# Patient Record
Sex: FEMALE | Race: WHITE | NOT HISPANIC OR LATINO | URBAN - METROPOLITAN AREA
[De-identification: names, ages, dates, MRNs, and addresses within clinical notes are randomized per-mention and may not be internally consistent; named-entity substitution may affect disease eponyms.]

---

## 2018-03-12 ENCOUNTER — INPATIENT (INPATIENT)
Facility: HOSPITAL | Age: 83
LOS: 7 days | Discharge: HOME | End: 2018-03-20
Attending: INTERNAL MEDICINE

## 2018-03-12 VITALS
RESPIRATION RATE: 18 BRPM | HEART RATE: 72 BPM | DIASTOLIC BLOOD PRESSURE: 82 MMHG | OXYGEN SATURATION: 100 % | TEMPERATURE: 97 F | SYSTOLIC BLOOD PRESSURE: 109 MMHG

## 2018-03-12 LAB
ALBUMIN SERPL ELPH-MCNC: 2.6 G/DL — LOW (ref 3–5.5)
ALP SERPL-CCNC: 57 U/L — SIGNIFICANT CHANGE UP (ref 30–115)
ALT FLD-CCNC: 9 U/L — SIGNIFICANT CHANGE UP (ref 0–41)
ANION GAP SERPL CALC-SCNC: 11 MMOL/L — SIGNIFICANT CHANGE UP (ref 7–14)
APTT BLD: 27 SEC — SIGNIFICANT CHANGE UP (ref 27–39.2)
AST SERPL-CCNC: 19 U/L — SIGNIFICANT CHANGE UP (ref 0–41)
BASOPHILS # BLD AUTO: 0.04 K/UL — SIGNIFICANT CHANGE UP (ref 0–0.2)
BASOPHILS NFR BLD AUTO: 0.6 % — SIGNIFICANT CHANGE UP (ref 0–1)
BILIRUB SERPL-MCNC: 1.1 MG/DL — SIGNIFICANT CHANGE UP (ref 0.2–1.2)
BUN SERPL-MCNC: 22 MG/DL — HIGH (ref 10–20)
CALCIUM SERPL-MCNC: 8.3 MG/DL — LOW (ref 8.5–10.1)
CHLORIDE SERPL-SCNC: 102 MMOL/L — SIGNIFICANT CHANGE UP (ref 98–110)
CO2 SERPL-SCNC: 22 MMOL/L — SIGNIFICANT CHANGE UP (ref 17–32)
CREAT SERPL-MCNC: 1.3 MG/DL — SIGNIFICANT CHANGE UP (ref 0.7–1.5)
EOSINOPHIL # BLD AUTO: 0.06 K/UL — SIGNIFICANT CHANGE UP (ref 0–0.7)
EOSINOPHIL NFR BLD AUTO: 0.9 % — SIGNIFICANT CHANGE UP (ref 0–8)
GLUCOSE SERPL-MCNC: 117 MG/DL — HIGH (ref 70–110)
HCT VFR BLD CALC: 36 % — LOW (ref 37–47)
HGB BLD-MCNC: 12.3 G/DL — SIGNIFICANT CHANGE UP (ref 12–16)
IMM GRANULOCYTES NFR BLD AUTO: 0.3 % — SIGNIFICANT CHANGE UP (ref 0.1–0.3)
INR BLD: 1.18 RATIO — SIGNIFICANT CHANGE UP (ref 0.65–1.3)
LYMPHOCYTES # BLD AUTO: 1.26 K/UL — SIGNIFICANT CHANGE UP (ref 1.2–3.4)
LYMPHOCYTES # BLD AUTO: 18.4 % — LOW (ref 20.5–51.1)
MCHC RBC-ENTMCNC: 33.2 PG — HIGH (ref 27–31)
MCHC RBC-ENTMCNC: 34.2 G/DL — SIGNIFICANT CHANGE UP (ref 32–37)
MCV RBC AUTO: 97 FL — SIGNIFICANT CHANGE UP (ref 81–99)
MONOCYTES # BLD AUTO: 0.94 K/UL — HIGH (ref 0.1–0.6)
MONOCYTES NFR BLD AUTO: 13.7 % — HIGH (ref 1.7–9.3)
NEUTROPHILS # BLD AUTO: 4.52 K/UL — SIGNIFICANT CHANGE UP (ref 1.4–6.5)
NEUTROPHILS NFR BLD AUTO: 66.1 % — SIGNIFICANT CHANGE UP (ref 42.2–75.2)
NRBC # BLD: 0 /100 WBCS — SIGNIFICANT CHANGE UP (ref 0–0)
PLATELET # BLD AUTO: 181 K/UL — SIGNIFICANT CHANGE UP (ref 130–400)
POTASSIUM SERPL-MCNC: 4.6 MMOL/L — SIGNIFICANT CHANGE UP (ref 3.5–5)
POTASSIUM SERPL-SCNC: 4.6 MMOL/L — SIGNIFICANT CHANGE UP (ref 3.5–5)
PROT SERPL-MCNC: 6 G/DL — SIGNIFICANT CHANGE UP (ref 6–8)
PROTHROM AB SERPL-ACNC: 12.8 SEC — SIGNIFICANT CHANGE UP (ref 9.95–12.87)
RBC # BLD: 3.71 M/UL — LOW (ref 4.2–5.4)
RBC # FLD: 12.5 % — SIGNIFICANT CHANGE UP (ref 11.5–14.5)
SODIUM SERPL-SCNC: 135 MMOL/L — SIGNIFICANT CHANGE UP (ref 135–146)
WBC # BLD: 6.84 K/UL — SIGNIFICANT CHANGE UP (ref 4.8–10.8)
WBC # FLD AUTO: 6.84 K/UL — SIGNIFICANT CHANGE UP (ref 4.8–10.8)

## 2018-03-12 RX ORDER — AMPICILLIN SODIUM AND SULBACTAM SODIUM 250; 125 MG/ML; MG/ML
3 INJECTION, POWDER, FOR SUSPENSION INTRAMUSCULAR; INTRAVENOUS ONCE
Qty: 0 | Refills: 0 | Status: COMPLETED | OUTPATIENT
Start: 2018-03-12 | End: 2018-03-12

## 2018-03-12 RX ADMIN — AMPICILLIN SODIUM AND SULBACTAM SODIUM 200 GRAM(S): 250; 125 INJECTION, POWDER, FOR SUSPENSION INTRAMUSCULAR; INTRAVENOUS at 22:31

## 2018-03-12 NOTE — ED ADULT NURSE NOTE - OBJECTIVE STATEMENT
pt presents to ed with wound to left arm and right leg, wound dsg was completed by md prior to rn assesment, pt is disoriented and [place] speaking but son is at bedside states she went to pmd who stated pt needed to come to er for severity of wound

## 2018-03-13 DIAGNOSIS — Z98.890 OTHER SPECIFIED POSTPROCEDURAL STATES: Chronic | ICD-10-CM

## 2018-03-13 LAB
ANION GAP SERPL CALC-SCNC: 10 MMOL/L — SIGNIFICANT CHANGE UP (ref 7–14)
BLD GP AB SCN SERPL QL: SIGNIFICANT CHANGE UP
BUN SERPL-MCNC: 21 MG/DL — HIGH (ref 10–20)
CALCIUM SERPL-MCNC: 8.2 MG/DL — LOW (ref 8.5–10.1)
CHLORIDE SERPL-SCNC: 105 MMOL/L — SIGNIFICANT CHANGE UP (ref 98–110)
CO2 SERPL-SCNC: 22 MMOL/L — SIGNIFICANT CHANGE UP (ref 17–32)
CREAT SERPL-MCNC: 1.2 MG/DL — SIGNIFICANT CHANGE UP (ref 0.7–1.5)
GLUCOSE SERPL-MCNC: 94 MG/DL — SIGNIFICANT CHANGE UP (ref 70–110)
HCT VFR BLD CALC: 30.3 % — LOW (ref 37–47)
HGB BLD-MCNC: 10.3 G/DL — LOW (ref 12–16)
MAGNESIUM SERPL-MCNC: 1.7 MG/DL — LOW (ref 1.8–2.4)
MCHC RBC-ENTMCNC: 32.7 PG — HIGH (ref 27–31)
MCHC RBC-ENTMCNC: 34 G/DL — SIGNIFICANT CHANGE UP (ref 32–37)
MCV RBC AUTO: 96.2 FL — SIGNIFICANT CHANGE UP (ref 81–99)
NRBC # BLD: 0 /100 WBCS — SIGNIFICANT CHANGE UP (ref 0–0)
PLATELET # BLD AUTO: 178 K/UL — SIGNIFICANT CHANGE UP (ref 130–400)
POTASSIUM SERPL-MCNC: 4.1 MMOL/L — SIGNIFICANT CHANGE UP (ref 3.5–5)
POTASSIUM SERPL-SCNC: 4.1 MMOL/L — SIGNIFICANT CHANGE UP (ref 3.5–5)
RBC # BLD: 3.15 M/UL — LOW (ref 4.2–5.4)
RBC # FLD: 12.5 % — SIGNIFICANT CHANGE UP (ref 11.5–14.5)
SODIUM SERPL-SCNC: 137 MMOL/L — SIGNIFICANT CHANGE UP (ref 135–146)
TYPE + AB SCN PNL BLD: SIGNIFICANT CHANGE UP
WBC # BLD: 5.69 K/UL — SIGNIFICANT CHANGE UP (ref 4.8–10.8)
WBC # FLD AUTO: 5.69 K/UL — SIGNIFICANT CHANGE UP (ref 4.8–10.8)

## 2018-03-13 RX ORDER — OLMESARTAN MEDOXOMIL 5 MG/1
1 TABLET, FILM COATED ORAL
Qty: 0 | Refills: 0 | COMMUNITY

## 2018-03-13 RX ORDER — HEPARIN SODIUM 5000 [USP'U]/ML
5000 INJECTION INTRAVENOUS; SUBCUTANEOUS EVERY 12 HOURS
Qty: 0 | Refills: 0 | Status: DISCONTINUED | OUTPATIENT
Start: 2018-03-13 | End: 2018-03-14

## 2018-03-13 RX ORDER — ASPIRIN/CALCIUM CARB/MAGNESIUM 324 MG
81 TABLET ORAL DAILY
Qty: 0 | Refills: 0 | Status: DISCONTINUED | OUTPATIENT
Start: 2018-03-13 | End: 2018-03-14

## 2018-03-13 RX ORDER — METOPROLOL TARTRATE 50 MG
1 TABLET ORAL
Qty: 0 | Refills: 0 | COMMUNITY

## 2018-03-13 RX ORDER — METOPROLOL TARTRATE 50 MG
25 TABLET ORAL DAILY
Qty: 0 | Refills: 0 | Status: DISCONTINUED | OUTPATIENT
Start: 2018-03-13 | End: 2018-03-14

## 2018-03-13 RX ORDER — SENNA PLUS 8.6 MG/1
2 TABLET ORAL DAILY
Qty: 0 | Refills: 0 | Status: DISCONTINUED | OUTPATIENT
Start: 2018-03-13 | End: 2018-03-14

## 2018-03-13 RX ORDER — LEVOTHYROXINE SODIUM 125 MCG
50 TABLET ORAL DAILY
Qty: 0 | Refills: 0 | Status: DISCONTINUED | OUTPATIENT
Start: 2018-03-13 | End: 2018-03-14

## 2018-03-13 RX ORDER — ESOMEPRAZOLE MAGNESIUM 40 MG/1
1 CAPSULE, DELAYED RELEASE ORAL
Qty: 0 | Refills: 0 | COMMUNITY

## 2018-03-13 RX ORDER — AMLODIPINE BESYLATE 2.5 MG/1
1 TABLET ORAL
Qty: 0 | Refills: 0 | COMMUNITY

## 2018-03-13 RX ORDER — LOSARTAN POTASSIUM 100 MG/1
100 TABLET, FILM COATED ORAL DAILY
Qty: 0 | Refills: 0 | Status: DISCONTINUED | OUTPATIENT
Start: 2018-03-13 | End: 2018-03-14

## 2018-03-13 RX ORDER — ATORVASTATIN CALCIUM 80 MG/1
10 TABLET, FILM COATED ORAL AT BEDTIME
Qty: 0 | Refills: 0 | Status: DISCONTINUED | OUTPATIENT
Start: 2018-03-13 | End: 2018-03-14

## 2018-03-13 RX ORDER — MEMANTINE HYDROCHLORIDE 10 MG/1
1 TABLET ORAL
Qty: 0 | Refills: 0 | COMMUNITY

## 2018-03-13 RX ORDER — ZOLPIDEM TARTRATE 10 MG/1
5 TABLET ORAL AT BEDTIME
Qty: 0 | Refills: 0 | Status: DISCONTINUED | OUTPATIENT
Start: 2018-03-13 | End: 2018-03-14

## 2018-03-13 RX ORDER — ASPIRIN/CALCIUM CARB/MAGNESIUM 324 MG
1 TABLET ORAL
Qty: 0 | Refills: 0 | COMMUNITY

## 2018-03-13 RX ORDER — ASCORBIC ACID 60 MG
1 TABLET,CHEWABLE ORAL
Qty: 0 | Refills: 0 | COMMUNITY

## 2018-03-13 RX ORDER — CETIRIZINE HYDROCHLORIDE 10 MG/1
1 TABLET ORAL
Qty: 0 | Refills: 0 | COMMUNITY

## 2018-03-13 RX ORDER — ZOLPIDEM TARTRATE 10 MG/1
1 TABLET ORAL
Qty: 0 | Refills: 0 | COMMUNITY

## 2018-03-13 RX ORDER — AMLODIPINE BESYLATE 2.5 MG/1
10 TABLET ORAL DAILY
Qty: 0 | Refills: 0 | Status: DISCONTINUED | OUTPATIENT
Start: 2018-03-13 | End: 2018-03-14

## 2018-03-13 RX ORDER — MEMANTINE HYDROCHLORIDE 10 MG/1
10 TABLET ORAL
Qty: 0 | Refills: 0 | Status: DISCONTINUED | OUTPATIENT
Start: 2018-03-13 | End: 2018-03-14

## 2018-03-13 RX ORDER — SENNA PLUS 8.6 MG/1
2 TABLET ORAL
Qty: 0 | Refills: 0 | COMMUNITY

## 2018-03-13 RX ORDER — ZINC SULFATE TAB 220 MG (50 MG ZINC EQUIVALENT) 220 (50 ZN) MG
220 TAB ORAL DAILY
Qty: 0 | Refills: 0 | Status: DISCONTINUED | OUTPATIENT
Start: 2018-03-13 | End: 2018-03-14

## 2018-03-13 RX ORDER — MAGNESIUM SULFATE 500 MG/ML
2 VIAL (ML) INJECTION ONCE
Qty: 0 | Refills: 0 | Status: COMPLETED | OUTPATIENT
Start: 2018-03-13 | End: 2018-03-13

## 2018-03-13 RX ORDER — ASCORBIC ACID 60 MG
500 TABLET,CHEWABLE ORAL DAILY
Qty: 0 | Refills: 0 | Status: DISCONTINUED | OUTPATIENT
Start: 2018-03-13 | End: 2018-03-14

## 2018-03-13 RX ORDER — LOVASTATIN 20 MG
1 TABLET ORAL
Qty: 0 | Refills: 0 | COMMUNITY

## 2018-03-13 RX ORDER — AMPICILLIN SODIUM AND SULBACTAM SODIUM 250; 125 MG/ML; MG/ML
3 INJECTION, POWDER, FOR SUSPENSION INTRAMUSCULAR; INTRAVENOUS EVERY 12 HOURS
Qty: 0 | Refills: 0 | Status: DISCONTINUED | OUTPATIENT
Start: 2018-03-13 | End: 2018-03-14

## 2018-03-13 RX ORDER — LEVOTHYROXINE SODIUM 125 MCG
1 TABLET ORAL
Qty: 0 | Refills: 0 | COMMUNITY

## 2018-03-13 RX ADMIN — Medication 1 APPLICATION(S): at 18:20

## 2018-03-13 RX ADMIN — ATORVASTATIN CALCIUM 10 MILLIGRAM(S): 80 TABLET, FILM COATED ORAL at 22:01

## 2018-03-13 RX ADMIN — Medication 25 MILLIGRAM(S): at 06:07

## 2018-03-13 RX ADMIN — Medication 1 TABLET(S): at 11:58

## 2018-03-13 RX ADMIN — AMPICILLIN SODIUM AND SULBACTAM SODIUM 200 GRAM(S): 250; 125 INJECTION, POWDER, FOR SUSPENSION INTRAMUSCULAR; INTRAVENOUS at 18:23

## 2018-03-13 RX ADMIN — Medication 500 MILLIGRAM(S): at 11:56

## 2018-03-13 RX ADMIN — Medication 50 GRAM(S): at 11:56

## 2018-03-13 RX ADMIN — AMPICILLIN SODIUM AND SULBACTAM SODIUM 200 GRAM(S): 250; 125 INJECTION, POWDER, FOR SUSPENSION INTRAMUSCULAR; INTRAVENOUS at 06:06

## 2018-03-13 RX ADMIN — Medication 1 APPLICATION(S): at 06:07

## 2018-03-13 RX ADMIN — AMLODIPINE BESYLATE 10 MILLIGRAM(S): 2.5 TABLET ORAL at 06:06

## 2018-03-13 RX ADMIN — MEMANTINE HYDROCHLORIDE 10 MILLIGRAM(S): 10 TABLET ORAL at 18:21

## 2018-03-13 RX ADMIN — LOSARTAN POTASSIUM 100 MILLIGRAM(S): 100 TABLET, FILM COATED ORAL at 06:07

## 2018-03-13 RX ADMIN — ZINC SULFATE TAB 220 MG (50 MG ZINC EQUIVALENT) 220 MILLIGRAM(S): 220 (50 ZN) TAB at 11:56

## 2018-03-13 RX ADMIN — Medication 50 MICROGRAM(S): at 06:07

## 2018-03-13 RX ADMIN — HEPARIN SODIUM 5000 UNIT(S): 5000 INJECTION INTRAVENOUS; SUBCUTANEOUS at 06:06

## 2018-03-13 RX ADMIN — MEMANTINE HYDROCHLORIDE 10 MILLIGRAM(S): 10 TABLET ORAL at 06:07

## 2018-03-13 RX ADMIN — Medication 81 MILLIGRAM(S): at 11:57

## 2018-03-13 RX ADMIN — HEPARIN SODIUM 5000 UNIT(S): 5000 INJECTION INTRAVENOUS; SUBCUTANEOUS at 18:19

## 2018-03-13 NOTE — ED PROVIDER NOTE - OBJECTIVE STATEMENT
89 yo F with history of HTN, HLD, hypothyroidism, previous poor skin integrity with skin tears, here for assessment of infected skin tear to R shin, uninfected tear to L forearm.    Per son, patient has no known recent trauma, falls, has previously obtained tears from minimal trauma such as rolling over in bed. Woke up Friday with blood on sheets, per son had rapidly progressing wound to R shin, was seen by PMD at home today and instructed to come to ED for infected wound.

## 2018-03-13 NOTE — H&P ADULT - NSHPPHYSICALEXAM_GEN_ALL_CORE
T(F): 98.3 (03-13-18 @ 00:26), Max: 98.3 (03-13-18 @ 00:26)  HR: 68 (03-13-18 @ 00:26) (68 - 72)  BP: 147/67 (03-13-18 @ 00:26) (109/82 - 147/67)  RR: 18 (03-13-18 @ 00:26) (18 - 18)  SpO2: 99% (03-13-18 @ 00:26) (99% - 100%)    Physical Exam:  General: Not in distress.   HEENT: Moist mucus membranes. PERRLA.  Cardio: Regular rate and rhythm, S1, S2, no murmur, rub, or gallop.  Pulm: Clear to auscultation bilaterally. No wheezing, rales, or rhonchi.  Abdomen: Soft, non-tender, non-distended. Normoactive bowel sounds.  Extremities: No cyanosis or edema bilaterally. No calf tenderness to palpation.  Neuro: A&O x3. No focal deficits. CN II - XII grossly intact. T(F): 98.3 (03-13-18 @ 00:26), Max: 98.3 (03-13-18 @ 00:26)  HR: 68 (03-13-18 @ 00:26) (68 - 72)  BP: 147/67 (03-13-18 @ 00:26) (109/82 - 147/67)  RR: 18 (03-13-18 @ 00:26) (18 - 18)  SpO2: 99% (03-13-18 @ 00:26) (99% - 100%)    Physical Exam:  General: Not in distress.   HEENT: Moist mucus membranes. PERRLA.  Cardio: Regular rate and rhythm, S1, S2, no murmur, rub, or gallop.  Pulm: Clear to auscultation bilaterally. No wheezing, rales, or rhonchi.  Abdomen: Soft, non-tender, non-distended. Normoactive bowel sounds.  Extremities: No cyanosis or edema bilaterally. No calf tenderness to palpation. Skin tear on anterior R. shin with purulent discharge. No surrounding erythema or warmth. Mild tenderness to palpation. LUE skin tear on forearm. No signs of infection.  Neuro: A&O x3. No focal deficits. CN II - XII grossly intact.

## 2018-03-13 NOTE — ED PROVIDER NOTE - NS ED ROS FT
Constitutional: no fever, chills, slow weight loss over 1 year associated with decreased appetitie  Cardiac: No chest pain, SOB or edema.  Respiratory: No cough or respiratory distress  GI: No nausea, vomiting, diarrhea or abdominal pain.  : No dysuria, frequency, urgency or hematuria  MS: see skin, otherwise no pain  Neuro: No headache or weakness. No LOC.  Skin: see HPI  Except as documented in the HPI, all other systems are negative.

## 2018-03-13 NOTE — H&P ADULT - HISTORY OF PRESENT ILLNESS
89 y/o F with PMH history of HTN, HLD, hypothyroidism, dementia, and history of multiple skin tears, presented from home for RLE and LUE skin tears. The patient was assessed by a visiting nurse practitioner, who was concerned about a skin tear on her RLE. She felt the wound looked infected and advised her to come to the hospital for evaluation.  The patient is a poor historian due to history of dementia, and all history was obtained from the son.  Her son states that for the past several days she seemed weaker than usual, and the leg wound, which has been present for "a while," has been looking worse over the past several days. He denies noticing any other symptoms. She has not had any fevers or change in mental status from baseline. He states that she does ambulate with a cane at baseline, but has been feeling too weak to walk over the past few days.  There is no history of falls or trauma to the leg.

## 2018-03-13 NOTE — ED PROVIDER NOTE - MEDICAL DECISION MAKING DETAILS
Patient with infected skin tear to R shin, uninfected tear to L forearm -- will check labs, give unasyn, may benefit from burn/wound care consult for shin skin tear

## 2018-03-13 NOTE — H&P ADULT - NSHPLABSRESULTS_GEN_ALL_CORE
CBC Full  -  ( 12 Mar 2018 22:21 )  WBC Count : 6.84 K/uL  Hemoglobin : 12.3 g/dL  Hematocrit : 36.0 %  Platelet Count - Automated : 181 K/uL  Mean Cell Volume : 97.0 fL  Mean Cell Hemoglobin : 33.2 pg  Mean Cell Hemoglobin Concentration : 34.2 g/dL  Auto Neutrophil % : 66.1 %  Auto Lymphocyte % : 18.4 %  Auto Monocyte % : 13.7 %  Auto Eosinophil % : 0.9 %  Auto Basophil % : 0.6 %    BMP: 03-12-18 @ 22:21  135 | 102 | 22   -----------------< 117  4.6  | 22  | 1.3  eGFR(AA): 41, eGFR (non-AA): 36  Ca 8.3, Mg --, P --    LFTs: 03-12-18 @ 22:21  TP  6.0  | 2.6 Alb   ---------------  TB  1.1  | --  DB   ---------------  ALT 9    | 19  AST             ^        57 ALK    PT/INR/PTT: 03-12-18 @ 22:21  12.80 | 27.0        ^      1.18

## 2018-03-13 NOTE — H&P ADULT - PSH
"Chief Complaint   Patient presents with     RECHECK     Re-establish Care and Medication Reconciliation       Initial /88  Pulse 90  Temp 98.1  F (36.7  C) (Oral)  Resp 17  Ht 5' 7\" (1.702 m)  Wt 235 lb 9.6 oz (106.9 kg)  SpO2 100%  BMI 36.9 kg/m2 Estimated body mass index is 36.9 kg/(m^2) as calculated from the following:    Height as of this encounter: 5' 7\" (1.702 m).    Weight as of this encounter: 235 lb 9.6 oz (106.9 kg).  Medication Reconciliation: complete    Health Maintenance Due Pending Provider Review:  NONE    n/a    Shante Farris MA  [facility]  "
History of bilateral knee replacement

## 2018-03-13 NOTE — H&P ADULT - ASSESSMENT
89 y/o F with PMH history of HTN, HLD, hypothyroidism, dementia, and history of multiple skin tears, presented from home for RLE and LUE skin tears.    1.) Infected skin tear on RLE:    - Admit to medicine.    - Monitor CBC.    - Start unasyn.    - Burn/plastics consult pending.    2.) L. forearm skin tear: Does not appear to be infected.    - Continue local wound care.    - Continue vitamin C supplementation.    - Start zinc supplementation.     3.) HTN / DLD: Continue amlodipine, losartan, aspirin, atorvastatin, and metoprolol.    4.) Dementia: Continue memantine.    5.) Hypothyroidism: Continue synthroid.    6.) GI / DVT PPx: not indicated / heparin    7.) Disposition:     - Full Code.

## 2018-03-13 NOTE — ED PROVIDER NOTE - CARE PLAN
Principal Discharge DX:	Cellulitis  Secondary Diagnosis:	Skin tear of left upper extremity  Secondary Diagnosis:	Skin tear of upper arm without complication

## 2018-03-13 NOTE — H&P ADULT - ATTENDING COMMENTS
Patient seen and examined independently. Agree with resident note with no exceptions. Case discussed with housestaff, nursing and patient/pt decision maker.   VITAL SIGNS (Last 24 hrs):  T(C): 36 (03-13-18 @ 09:15), Max: 36.8 (03-13-18 @ 00:26)  HR: 72 (03-13-18 @ 09:15) (66 - 72)  BP: 134/63 (03-13-18 @ 09:15) (109/82 - 155/70)  RR: 17 (03-13-18 @ 09:15) (17 - 20)  SpO2: 98% (03-13-18 @ 09:15) (98% - 100%)  Wt(kg): --  Daily     Daily     I&O's Summary                        10.3   5.69  )-----------( 178      ( 13 Mar 2018 05:59 )             30.3   03-13    137  |  105  |  21<H>  ----------------------------<  94  4.1   |  22  |  1.2    Ca    8.2<L>      13 Mar 2018 05:59  Mg     1.7     03-13    TPro  6.0  /  Alb  2.6<L>  /  TBili  1.1  /  DBili  x   /  AST  19  /  ALT  9   /  AlkPhos  57  03-12    O/E  AWAKE AND ALERT  CHEST-B/L AIR ENTRY  CVS-S1S2N  ABD- SOFT, BS+  LOWER EXTREMITY- RT LOWER EXT ULCER    ASSESSMENT AND PLAN  RT LOWER EXTREMITY ULCERS- needs debridement in OR as per Burn. Not clinically necrotizing fascitis  on unasyn.  x ray lower extremity-< from: Xray Tibia + Fibula 2 Views, Right (03.12.18 @ 22:27) >    impression: Anterior midline soft tissue ulceration with linear   subcutaneous emphysema extending to the mid third of leg. Correlate   clinically for necrotizing fasciitis. Consider noncontrast leg CT for   further evaluation   HTN- on amlodipine and losarrtan  Hypothyroidism- on synthroid

## 2018-03-13 NOTE — ED PROVIDER NOTE - PHYSICAL EXAMINATION
VITAL SIGNS: I have reviewed nursing notes and confirm.  CONSTITUTIONAL: Well-developed; well-nourished; in no acute distress.  SKIN: large 6cm skin tear to R shin, at ends is only superficial but towards middle involves subcutaneous tissue, possibly down to muscle, surrounding warmth, redness/bruising, has 3cm in diameter round skin tear to L forearm, no signs of infection  HEAD: Normocephalic; atraumatic.  EYES: PERRL, EOM intact; conjunctiva and sclera clear.  ENT: No nasal discharge; airway clear.  NECK: Supple; non tender.  CARD: Regular rate and rhythm.  RESP: No wheezes, rales or rhonchi.  ABD: Normal bowel sounds; soft; non-distended; non-tender; no hepatosplenomegaly.  EXT: Normal ROM. No clubbing, cyanosis or edema.  NEURO: Alert, oriented. Grossly unremarkable. No focal deficits.  PSYCH: Cooperative, appropriate.

## 2018-03-13 NOTE — H&P ADULT - NSHPSOCIALHISTORY_GEN_ALL_CORE
Never smoked. No current or past alcohol or drug use. Ambulates with cane at baseline. Requires help with ADLs. Has home health aid.

## 2018-03-13 NOTE — H&P ADULT - PMH
Acquired hypothyroidism    Dementia without behavioral disturbance, unspecified dementia type    Dyslipidemia    Essential hypertension

## 2018-03-14 DIAGNOSIS — L03.90 CELLULITIS, UNSPECIFIED: ICD-10-CM

## 2018-03-14 DIAGNOSIS — I48.0 PAROXYSMAL ATRIAL FIBRILLATION: ICD-10-CM

## 2018-03-14 LAB
ANION GAP SERPL CALC-SCNC: 9 MMOL/L — SIGNIFICANT CHANGE UP (ref 7–14)
BUN SERPL-MCNC: 27 MG/DL — HIGH (ref 10–20)
CALCIUM SERPL-MCNC: 8.4 MG/DL — LOW (ref 8.5–10.1)
CHLORIDE SERPL-SCNC: 105 MMOL/L — SIGNIFICANT CHANGE UP (ref 98–110)
CO2 SERPL-SCNC: 22 MMOL/L — SIGNIFICANT CHANGE UP (ref 17–32)
CREAT SERPL-MCNC: 1.2 MG/DL — SIGNIFICANT CHANGE UP (ref 0.7–1.5)
CRP SERPL-MCNC: 10.6 MG/DL — HIGH (ref 0–0.4)
ERYTHROCYTE [SEDIMENTATION RATE] IN BLOOD: >140 MM/HR — HIGH (ref 0–20)
GLUCOSE SERPL-MCNC: 100 MG/DL — SIGNIFICANT CHANGE UP (ref 70–110)
HCT VFR BLD CALC: 32.3 % — LOW (ref 37–47)
HGB BLD-MCNC: 11.1 G/DL — LOW (ref 12–16)
MCHC RBC-ENTMCNC: 32.8 PG — HIGH (ref 27–31)
MCHC RBC-ENTMCNC: 34.4 G/DL — SIGNIFICANT CHANGE UP (ref 32–37)
MCV RBC AUTO: 95.6 FL — SIGNIFICANT CHANGE UP (ref 81–99)
NRBC # BLD: 0 /100 WBCS — SIGNIFICANT CHANGE UP (ref 0–0)
PLATELET # BLD AUTO: 209 K/UL — SIGNIFICANT CHANGE UP (ref 130–400)
POTASSIUM SERPL-MCNC: 4.3 MMOL/L — SIGNIFICANT CHANGE UP (ref 3.5–5)
POTASSIUM SERPL-SCNC: 4.3 MMOL/L — SIGNIFICANT CHANGE UP (ref 3.5–5)
RBC # BLD: 3.38 M/UL — LOW (ref 4.2–5.4)
RBC # FLD: 12.3 % — SIGNIFICANT CHANGE UP (ref 11.5–14.5)
SODIUM SERPL-SCNC: 136 MMOL/L — SIGNIFICANT CHANGE UP (ref 135–146)
WBC # BLD: 5.58 K/UL — SIGNIFICANT CHANGE UP (ref 4.8–10.8)
WBC # FLD AUTO: 5.58 K/UL — SIGNIFICANT CHANGE UP (ref 4.8–10.8)

## 2018-03-14 RX ORDER — METOPROLOL TARTRATE 50 MG
25 TABLET ORAL DAILY
Qty: 0 | Refills: 0 | Status: DISCONTINUED | OUTPATIENT
Start: 2018-03-14 | End: 2018-03-20

## 2018-03-14 RX ORDER — MORPHINE SULFATE 50 MG/1
0.5 CAPSULE, EXTENDED RELEASE ORAL
Qty: 0 | Refills: 0 | Status: DISCONTINUED | OUTPATIENT
Start: 2018-03-14 | End: 2018-03-14

## 2018-03-14 RX ORDER — HYDROMORPHONE HYDROCHLORIDE 2 MG/ML
0.25 INJECTION INTRAMUSCULAR; INTRAVENOUS; SUBCUTANEOUS
Qty: 0 | Refills: 0 | Status: ON HOLD | OUTPATIENT
Start: 2018-03-14

## 2018-03-14 RX ORDER — HEPARIN SODIUM 5000 [USP'U]/ML
5000 INJECTION INTRAVENOUS; SUBCUTANEOUS EVERY 12 HOURS
Qty: 0 | Refills: 0 | Status: DISCONTINUED | OUTPATIENT
Start: 2018-03-14 | End: 2018-03-20

## 2018-03-14 RX ORDER — OXYCODONE AND ACETAMINOPHEN 5; 325 MG/1; MG/1
1 TABLET ORAL EVERY 4 HOURS
Qty: 0 | Refills: 0 | Status: ON HOLD | OUTPATIENT
Start: 2018-03-14

## 2018-03-14 RX ORDER — ZOLPIDEM TARTRATE 10 MG/1
5 TABLET ORAL AT BEDTIME
Qty: 0 | Refills: 0 | Status: DISCONTINUED | OUTPATIENT
Start: 2018-03-14 | End: 2018-03-20

## 2018-03-14 RX ORDER — AMPICILLIN SODIUM AND SULBACTAM SODIUM 250; 125 MG/ML; MG/ML
3 INJECTION, POWDER, FOR SUSPENSION INTRAMUSCULAR; INTRAVENOUS EVERY 12 HOURS
Qty: 0 | Refills: 0 | Status: DISCONTINUED | OUTPATIENT
Start: 2018-03-14 | End: 2018-03-15

## 2018-03-14 RX ORDER — SENNA PLUS 8.6 MG/1
2 TABLET ORAL DAILY
Qty: 0 | Refills: 0 | Status: DISCONTINUED | OUTPATIENT
Start: 2018-03-14 | End: 2018-03-20

## 2018-03-14 RX ORDER — MEMANTINE HYDROCHLORIDE 10 MG/1
10 TABLET ORAL
Qty: 0 | Refills: 0 | Status: DISCONTINUED | OUTPATIENT
Start: 2018-03-14 | End: 2018-03-20

## 2018-03-14 RX ORDER — OXYCODONE AND ACETAMINOPHEN 5; 325 MG/1; MG/1
1 TABLET ORAL EVERY 4 HOURS
Qty: 0 | Refills: 0 | Status: DISCONTINUED | OUTPATIENT
Start: 2018-03-14 | End: 2018-03-14

## 2018-03-14 RX ORDER — ONDANSETRON 8 MG/1
4 TABLET, FILM COATED ORAL EVERY 8 HOURS
Qty: 0 | Refills: 0 | Status: ON HOLD | OUTPATIENT
Start: 2018-03-14

## 2018-03-14 RX ORDER — ASPIRIN/CALCIUM CARB/MAGNESIUM 324 MG
81 TABLET ORAL DAILY
Qty: 0 | Refills: 0 | Status: DISCONTINUED | OUTPATIENT
Start: 2018-03-14 | End: 2018-03-20

## 2018-03-14 RX ORDER — AMLODIPINE BESYLATE 2.5 MG/1
10 TABLET ORAL DAILY
Qty: 0 | Refills: 0 | Status: DISCONTINUED | OUTPATIENT
Start: 2018-03-14 | End: 2018-03-20

## 2018-03-14 RX ORDER — LOSARTAN POTASSIUM 100 MG/1
100 TABLET, FILM COATED ORAL DAILY
Qty: 0 | Refills: 0 | Status: DISCONTINUED | OUTPATIENT
Start: 2018-03-14 | End: 2018-03-20

## 2018-03-14 RX ORDER — ACETAMINOPHEN 500 MG
650 TABLET ORAL EVERY 6 HOURS
Qty: 0 | Refills: 0 | Status: ON HOLD | OUTPATIENT
Start: 2018-03-14

## 2018-03-14 RX ORDER — SODIUM CHLORIDE 9 MG/ML
1000 INJECTION, SOLUTION INTRAVENOUS
Qty: 0 | Refills: 0 | Status: DISCONTINUED | OUTPATIENT
Start: 2018-03-14 | End: 2018-03-16

## 2018-03-14 RX ORDER — MEPERIDINE HYDROCHLORIDE 50 MG/ML
12.5 INJECTION INTRAMUSCULAR; INTRAVENOUS; SUBCUTANEOUS
Qty: 0 | Refills: 0 | Status: ON HOLD | OUTPATIENT
Start: 2018-03-14

## 2018-03-14 RX ORDER — ASCORBIC ACID 60 MG
500 TABLET,CHEWABLE ORAL DAILY
Qty: 0 | Refills: 0 | Status: DISCONTINUED | OUTPATIENT
Start: 2018-03-14 | End: 2018-03-20

## 2018-03-14 RX ORDER — ATORVASTATIN CALCIUM 80 MG/1
10 TABLET, FILM COATED ORAL AT BEDTIME
Qty: 0 | Refills: 0 | Status: DISCONTINUED | OUTPATIENT
Start: 2018-03-14 | End: 2018-03-20

## 2018-03-14 RX ORDER — ONDANSETRON 8 MG/1
4 TABLET, FILM COATED ORAL ONCE
Qty: 0 | Refills: 0 | Status: DISCONTINUED | OUTPATIENT
Start: 2018-03-14 | End: 2018-03-14

## 2018-03-14 RX ORDER — ZINC SULFATE TAB 220 MG (50 MG ZINC EQUIVALENT) 220 (50 ZN) MG
220 TAB ORAL DAILY
Qty: 0 | Refills: 0 | Status: DISCONTINUED | OUTPATIENT
Start: 2018-03-14 | End: 2018-03-20

## 2018-03-14 RX ORDER — LEVOTHYROXINE SODIUM 125 MCG
50 TABLET ORAL DAILY
Qty: 0 | Refills: 0 | Status: DISCONTINUED | OUTPATIENT
Start: 2018-03-14 | End: 2018-03-20

## 2018-03-14 RX ADMIN — Medication 25 MILLIGRAM(S): at 05:24

## 2018-03-14 RX ADMIN — Medication 81 MILLIGRAM(S): at 11:31

## 2018-03-14 RX ADMIN — Medication 500 MILLIGRAM(S): at 11:30

## 2018-03-14 RX ADMIN — Medication 50 MICROGRAM(S): at 05:25

## 2018-03-14 RX ADMIN — Medication 1 TABLET(S): at 11:33

## 2018-03-14 RX ADMIN — MEMANTINE HYDROCHLORIDE 10 MILLIGRAM(S): 10 TABLET ORAL at 05:25

## 2018-03-14 RX ADMIN — ZINC SULFATE TAB 220 MG (50 MG ZINC EQUIVALENT) 220 MILLIGRAM(S): 220 (50 ZN) TAB at 11:30

## 2018-03-14 RX ADMIN — LOSARTAN POTASSIUM 100 MILLIGRAM(S): 100 TABLET, FILM COATED ORAL at 05:25

## 2018-03-14 RX ADMIN — Medication 1 APPLICATION(S): at 06:22

## 2018-03-14 RX ADMIN — AMPICILLIN SODIUM AND SULBACTAM SODIUM 200 GRAM(S): 250; 125 INJECTION, POWDER, FOR SUSPENSION INTRAMUSCULAR; INTRAVENOUS at 05:25

## 2018-03-14 RX ADMIN — AMLODIPINE BESYLATE 10 MILLIGRAM(S): 2.5 TABLET ORAL at 05:25

## 2018-03-14 NOTE — PACU DISCHARGE NOTE - COMMENTS
Patient stable upon arrival to PACU. Report given to RN. VSS: 115/48, HR 65, RR 12, Temp 97.5, O2 Saturation: 96%

## 2018-03-14 NOTE — PHYSICAL THERAPY INITIAL EVALUATION ADULT - IMPAIRMENTS FOUND, PT EVAL
poor safety awareness/muscle strength/gait, locomotion, and balance/arousal, attention, and cognition

## 2018-03-14 NOTE — PHYSICAL THERAPY INITIAL EVALUATION ADULT - GENERAL OBSERVATIONS, REHAB EVAL
9:40-10:10 30 min  Patient encountered semi painter in bed + Iv lock, NAD , Receptive to PT R LE gauze bandage

## 2018-03-14 NOTE — PRE-ANESTHESIA EVALUATION ADULT - NSANTHADDINFOFT_GEN_ALL_CORE
R/B/A explained to son who is mothers next of kin health care proxy  all questions ans. plan general anesthesia  case d/w TOMI Ibarra

## 2018-03-14 NOTE — PROGRESS NOTE ADULT - PROBLEM SELECTOR PLAN 2
- CHADSVASC of 4, but given patient's poor functional status and pending OR no anticoagulation to be started at this time.

## 2018-03-14 NOTE — PROGRESS NOTE ADULT - PROBLEM SELECTOR PLAN 1
- Continue antibiotics  - Dressing as per Burn team  - Spoke with Burn PA yesterday who said they do not believe this wound to be necrotizing fascitis. Will take the patient for debridement in the OR either as an add on today or tomorrow

## 2018-03-14 NOTE — BRIEF OPERATIVE NOTE - PROCEDURE
<<-----Click on this checkbox to enter Procedure Debridement  03/14/2018  sharp excisional debridement right lower leg [id number] and left arm 10x5cm, choduhury catheter insertion  Active  [id number]

## 2018-03-14 NOTE — PHYSICAL THERAPY INITIAL EVALUATION ADULT - MANUAL MUSCLE TESTING RESULTS, REHAB EVAL
grossly assessed due to/due to inability to follow directions , B shldrs ~ 3-/5 , 3/5 distally , B LES ~ 3/5

## 2018-03-14 NOTE — PROGRESS NOTE ADULT - SUBJECTIVE AND OBJECTIVE BOX
CHANDRIKA OSMAN 89y Female   MRN#: [id number]    Patient is a 89y old Female who presents with a chief complaint of R. LE skin tear with purulence. Currently admitted to medicine with the primary diagnosis of Cellulitis of right LE. Today is hospital day 1d. This morning she is resting comfortably in bed and reports no new issues or overnight events. Hospital course complicated by new onset Afib    PAST MEDICAL & SURGICAL HISTORY  Dementia without behavioral disturbance, unspecified dementia type  Acquired hypothyroidism  Dyslipidemia  Essential hypertension  History of bilateral knee replacement      ALLERGIES:  No Known Allergies      MEDICATIONS:  STANDING MEDICATIONS  amLODIPine   Tablet 10 milliGRAM(s) Oral daily  ampicillin/sulbactam  IVPB 3 Gram(s) IV Intermittent every 12 hours  ascorbic acid 500 milliGRAM(s) Oral daily  aspirin  chewable 81 milliGRAM(s) Oral daily  atorvastatin 10 milliGRAM(s) Oral at bedtime  calcium carbonate  625 mG + Vitamin D (OsCal 250 + D) 1 Tablet(s) Oral daily  heparin  Injectable 5000 Unit(s) SubCutaneous every 12 hours  levothyroxine 50 MICROGram(s) Oral daily  losartan 100 milliGRAM(s) Oral daily  memantine 10 milliGRAM(s) Oral two times a day  metoprolol succinate ER 25 milliGRAM(s) Oral daily  silver sulfADIAZINE 1% Cream 1 Application(s) Topical two times a day  zinc sulfate 220 milliGRAM(s) Oral daily    PRN MEDICATIONS  artificial  tears Solution 1 Drop(s) Both EYES every 6 hours PRN  senna 2 Tablet(s) Oral daily PRN  zolpidem 5 milliGRAM(s) Oral at bedtime PRN  zolpidem 5 milliGRAM(s) Oral at bedtime PRN      VITALS:   T(F): 97.4  HR: 66  BP: 129/64  RR: 18  SpO2: 96%    LABS:                        11.1   5.58  )-----------( 209      ( 14 Mar 2018 07:45 )             32.3     03-14    136  |  105  |  27<H>  ----------------------------<  100  4.3   |  22  |  1.2    Ca    8.4<L>      14 Mar 2018 07:45  Mg     1.7     03-13    TPro  6.0  /  Alb  2.6<L>  /  TBili  1.1  /  DBili  x   /  AST  19  /  ALT  9   /  AlkPhos  57  03-12    PT/INR - ( 12 Mar 2018 22:21 )   PT: 12.80 sec;   INR: 1.18 ratio         PTT - ( 12 Mar 2018 22:21 )  PTT:27.0 sec        RADIOLOGY:  Xray Tibia + Fibula 2 Views, Right   Anterior midline soft tissue ulceration with linear subcutaneous emphysema extending to the mid third of leg. Correlate clinically for necrotizing fasciitis. Consider noncontrast leg CT for further evaluation      PHYSICAL EXAM:    GENERAL: NAD, frail  HEAD:  Atraumatic, Normocephalic  CHEST/LUNG: Decreased air entry bilaterally secondary to inspiratory effort  HEART: Regular rate and rhythm; No murmurs  ABDOMEN: Soft, Nontender, Nondistended; Bowel sounds present  EXTREMITIES:  2+ Peripheral Pulses, Positive for RLE wound, with necrotic borders.  PSYCH: AAOx2

## 2018-03-14 NOTE — PRE-ANESTHESIA EVALUATION ADULT - NSANTHOSAYNRD_GEN_A_CORE
No. RICARDO screening performed.  STOP BANG Legend: 0-2 = LOW Risk; 3-4 = INTERMEDIATE Risk; 5-8 = HIGH Risk

## 2018-03-14 NOTE — PROGRESS NOTE ADULT - SUBJECTIVE AND OBJECTIVE BOX
BEINISHES, CHANDRIKA  89y Female    INTERVAL HPI/OVERNIGHT EVENTS:    T(F): 97.4 (03-14-18 @ 08:41), Max: 97.4 (03-14-18 @ 08:41)  HR: 66 (03-14-18 @ 08:41) (62 - 66)  BP: 129/64 (03-14-18 @ 08:41) (123/61 - 162/73)  RR: 18 (03-14-18 @ 08:41) (18 - 18)  SpO2: 96% (03-14-18 @ 08:41) (96% - 97%)  I&O's Summary    Daily     Daily   CAPILLARY BLOOD GLUCOSE            PHYSICAL EXAM:  GENERAL: NAD  HEAD:  Atraumatic, Normocephalic  EYES:  conjunctiva and sclera clear  ENMT: Moist mucous membranes  NECK: Supple, No JVD  NERVOUS SYSTEM:  Alert and awake  CHEST/LUNG:  No rales, rhonchi, wheezing  HEART: Regular rate and rhythm; No murmurs  ABDOMEN: Soft, Nontender, Nondistended; Bowel sounds present  EXTREMITIES:  2+ Peripheral Pulses, No edema  SKIN: No rashes or lesions    Consultant(s) Notes Reviewed:  [ ] YES  [ ] NO  Care Discussed with Consultants/Other Providers [ ] YES  [ ] NO     MEDICATIONS  (STANDING):  amLODIPine   Tablet 10 milliGRAM(s) Oral daily  ampicillin/sulbactam  IVPB 3 Gram(s) IV Intermittent every 12 hours  ascorbic acid 500 milliGRAM(s) Oral daily  aspirin  chewable 81 milliGRAM(s) Oral daily  atorvastatin 10 milliGRAM(s) Oral at bedtime  calcium carbonate  625 mG + Vitamin D (OsCal 250 + D) 1 Tablet(s) Oral daily  heparin  Injectable 5000 Unit(s) SubCutaneous every 12 hours  levothyroxine 50 MICROGram(s) Oral daily  losartan 100 milliGRAM(s) Oral daily  memantine 10 milliGRAM(s) Oral two times a day  metoprolol succinate ER 25 milliGRAM(s) Oral daily  silver sulfADIAZINE 1% Cream 1 Application(s) Topical two times a day  zinc sulfate 220 milliGRAM(s) Oral daily    EKG reviewed  LABS:                        11.1   5.58  )-----------( 209      ( 14 Mar 2018 07:45 )             32.3     03-14    136  |  105  |  27<H>  ----------------------------<  100  4.3   |  22  |  1.2    Ca    8.4<L>      14 Mar 2018 07:45  Mg     1.7     03-13    TPro  6.0  /  Alb  2.6<L>  /  TBili  1.1  /  DBili  x   /  AST  19  /  ALT  9   /  AlkPhos  57  03-12    PT/INR - ( 12 Mar 2018 22:21 )   PT: 12.80 sec;   INR: 1.18 ratio         PTT - ( 12 Mar 2018 22:21 )  PTT:27.0 sec          RADIOLOGY & ADDITIONAL TESTS:    Imaging or report Personally Reviewed:  [ ] YES  [ ] NO    Case discussed with resident    Care discussed with pt/family BEINISHES, CHANDRIKA  89y Female    INTERVAL HPI/OVERNIGHT EVENTS:    T(F): 97.4 (03-14-18 @ 08:41), Max: 97.4 (03-14-18 @ 08:41)  HR: 66 (03-14-18 @ 08:41) (62 - 66)  BP: 129/64 (03-14-18 @ 08:41) (123/61 - 162/73)  RR: 18 (03-14-18 @ 08:41) (18 - 18)  SpO2: 96% (03-14-18 @ 08:41) (96% - 97%)  I&O's Summary    Daily     Daily   CAPILLARY BLOOD GLUCOSE            PHYSICAL EXAM:  GENERAL: NAD  HEAD:  Atraumatic, Normocephalic  EYES:  conjunctiva and sclera clear  ENMT: Moist mucous membranes  NECK: Supple, No JVD  NERVOUS SYSTEM:  Alert and awake  CHEST/LUNG:  No rales, rhonchi, wheezing  HEART: Regular rate and rhythm; No murmurs  ABDOMEN: Soft, Nontender, Nondistended; Bowel sounds present  EXTREMITIES:   edema mild with ulcer in rt shin and lt hand.  SKIN: ecchymotic patches     Consultant(s) Notes Reviewed:  [ ] YES  [ ] NO  Care Discussed with Consultants/Other Providers [ ] YES  [ ] NO     MEDICATIONS  (STANDING):  amLODIPine   Tablet 10 milliGRAM(s) Oral daily  ampicillin/sulbactam  IVPB 3 Gram(s) IV Intermittent every 12 hours  ascorbic acid 500 milliGRAM(s) Oral daily  aspirin  chewable 81 milliGRAM(s) Oral daily  atorvastatin 10 milliGRAM(s) Oral at bedtime  calcium carbonate  625 mG + Vitamin D (OsCal 250 + D) 1 Tablet(s) Oral daily  heparin  Injectable 5000 Unit(s) SubCutaneous every 12 hours  levothyroxine 50 MICROGram(s) Oral daily  losartan 100 milliGRAM(s) Oral daily  memantine 10 milliGRAM(s) Oral two times a day  metoprolol succinate ER 25 milliGRAM(s) Oral daily  silver sulfADIAZINE 1% Cream 1 Application(s) Topical two times a day  zinc sulfate 220 milliGRAM(s) Oral daily    EKG reviewed  LABS:                        11.1   5.58  )-----------( 209      ( 14 Mar 2018 07:45 )             32.3     03-14    136  |  105  |  27<H>  ----------------------------<  100  4.3   |  22  |  1.2    Ca    8.4<L>      14 Mar 2018 07:45  Mg     1.7     03-13    TPro  6.0  /  Alb  2.6<L>  /  TBili  1.1  /  DBili  x   /  AST  19  /  ALT  9   /  AlkPhos  57  03-12    PT/INR - ( 12 Mar 2018 22:21 )   PT: 12.80 sec;   INR: 1.18 ratio         PTT - ( 12 Mar 2018 22:21 )  PTT:27.0 sec          RADIOLOGY & ADDITIONAL TESTS:    Imaging or report Personally Reviewed:  [x] YES  [ ] NO    Case discussed with resident    Care discussed with pt/family

## 2018-03-14 NOTE — PROGRESS NOTE ADULT - ATTENDING COMMENTS
ASSESSMENT AND PLAN:  #Rt leg ulcer- on unasyn may go to OR for debridedement unlikly necrotizing fascitis. BP maintained and patient has no tenderness or fever. Her  ESR is very elevated at 140.    #Advanced age with debility- patient family supportive and she lives with them.    # afib rate 65/min not on AC. will not start anticoagulation as she is going for procedure today. Rate is controlled. As per son she is close to the end of her life and will not do any aggressive measures other than comfort for her.

## 2018-03-15 LAB
ANION GAP SERPL CALC-SCNC: 12 MMOL/L — SIGNIFICANT CHANGE UP (ref 7–14)
APTT BLD: 26.2 SEC — LOW (ref 27–39.2)
BASOPHILS # BLD AUTO: 0.01 K/UL — SIGNIFICANT CHANGE UP (ref 0–0.2)
BASOPHILS NFR BLD AUTO: 0.2 % — SIGNIFICANT CHANGE UP (ref 0–1)
BUN SERPL-MCNC: 19 MG/DL — SIGNIFICANT CHANGE UP (ref 10–20)
CALCIUM SERPL-MCNC: 7.7 MG/DL — LOW (ref 8.5–10.1)
CHLORIDE SERPL-SCNC: 108 MMOL/L — SIGNIFICANT CHANGE UP (ref 98–110)
CO2 SERPL-SCNC: 16 MMOL/L — LOW (ref 17–32)
CREAT SERPL-MCNC: 0.8 MG/DL — SIGNIFICANT CHANGE UP (ref 0.7–1.5)
EOSINOPHIL # BLD AUTO: 0 K/UL — SIGNIFICANT CHANGE UP (ref 0–0.7)
EOSINOPHIL NFR BLD AUTO: 0 % — SIGNIFICANT CHANGE UP (ref 0–8)
GLUCOSE SERPL-MCNC: 100 MG/DL — SIGNIFICANT CHANGE UP (ref 70–110)
HCT VFR BLD CALC: 22.8 % — LOW (ref 37–47)
HCT VFR BLD CALC: 26.9 % — LOW (ref 37–47)
HCT VFR BLD CALC: 27.6 % — LOW (ref 37–47)
HGB BLD-MCNC: 7.6 G/DL — LOW (ref 12–16)
HGB BLD-MCNC: 9.1 G/DL — LOW (ref 12–16)
HGB BLD-MCNC: 9.2 G/DL — LOW (ref 12–16)
IMM GRANULOCYTES NFR BLD AUTO: 0.4 % — HIGH (ref 0.1–0.3)
INR BLD: 1.35 RATIO — HIGH (ref 0.65–1.3)
LDH SERPL L TO P-CCNC: 178 — SIGNIFICANT CHANGE UP (ref 50–242)
LYMPHOCYTES # BLD AUTO: 0.49 K/UL — LOW (ref 1.2–3.4)
LYMPHOCYTES # BLD AUTO: 11 % — LOW (ref 20.5–51.1)
MAGNESIUM SERPL-MCNC: 2.1 MG/DL — SIGNIFICANT CHANGE UP (ref 1.8–2.4)
MCHC RBC-ENTMCNC: 32.2 PG — HIGH (ref 27–31)
MCHC RBC-ENTMCNC: 32.7 PG — HIGH (ref 27–31)
MCHC RBC-ENTMCNC: 33 G/DL — SIGNIFICANT CHANGE UP (ref 32–37)
MCHC RBC-ENTMCNC: 33 PG — HIGH (ref 27–31)
MCHC RBC-ENTMCNC: 33.3 G/DL — SIGNIFICANT CHANGE UP (ref 32–37)
MCHC RBC-ENTMCNC: 34.2 G/DL — SIGNIFICANT CHANGE UP (ref 32–37)
MCV RBC AUTO: 95.7 FL — SIGNIFICANT CHANGE UP (ref 81–99)
MCV RBC AUTO: 97.5 FL — SIGNIFICANT CHANGE UP (ref 81–99)
MCV RBC AUTO: 99.1 FL — HIGH (ref 81–99)
MONOCYTES # BLD AUTO: 0.13 K/UL — SIGNIFICANT CHANGE UP (ref 0.1–0.6)
MONOCYTES NFR BLD AUTO: 2.9 % — SIGNIFICANT CHANGE UP (ref 1.7–9.3)
NEUTROPHILS # BLD AUTO: 3.82 K/UL — SIGNIFICANT CHANGE UP (ref 1.4–6.5)
NEUTROPHILS NFR BLD AUTO: 85.5 % — HIGH (ref 42.2–75.2)
NRBC # BLD: 0 /100 WBCS — SIGNIFICANT CHANGE UP (ref 0–0)
PHOSPHATE SERPL-MCNC: 4.5 MG/DL — SIGNIFICANT CHANGE UP (ref 2.1–4.9)
PLATELET # BLD AUTO: 147 K/UL — SIGNIFICANT CHANGE UP (ref 130–400)
PLATELET # BLD AUTO: 188 K/UL — SIGNIFICANT CHANGE UP (ref 130–400)
PLATELET # BLD AUTO: 201 K/UL — SIGNIFICANT CHANGE UP (ref 130–400)
POTASSIUM SERPL-MCNC: 4.7 MMOL/L — SIGNIFICANT CHANGE UP (ref 3.5–5)
POTASSIUM SERPL-SCNC: 4.7 MMOL/L — SIGNIFICANT CHANGE UP (ref 3.5–5)
PROTHROM AB SERPL-ACNC: 14.7 SEC — HIGH (ref 9.95–12.87)
RBC # BLD: 2.3 M/UL — LOW (ref 4.2–5.4)
RBC # BLD: 2.81 M/UL — LOW (ref 4.2–5.4)
RBC # BLD: 2.81 M/UL — LOW (ref 4.2–5.4)
RBC # BLD: 2.83 M/UL — LOW (ref 4.2–5.4)
RBC # FLD: 12.1 % — SIGNIFICANT CHANGE UP (ref 11.5–14.5)
RBC # FLD: 12.3 % — SIGNIFICANT CHANGE UP (ref 11.5–14.5)
RBC # FLD: 12.5 % — SIGNIFICANT CHANGE UP (ref 11.5–14.5)
RETICS #: 40.7 K/UL — SIGNIFICANT CHANGE UP (ref 25–125)
RETICS/RBC NFR: 1.5 % — SIGNIFICANT CHANGE UP (ref 0.5–1.5)
SODIUM SERPL-SCNC: 136 MMOL/L — SIGNIFICANT CHANGE UP (ref 135–146)
WBC # BLD: 3.63 K/UL — LOW (ref 4.8–10.8)
WBC # BLD: 4.47 K/UL — LOW (ref 4.8–10.8)
WBC # BLD: 9.98 K/UL — SIGNIFICANT CHANGE UP (ref 4.8–10.8)
WBC # FLD AUTO: 3.63 K/UL — LOW (ref 4.8–10.8)
WBC # FLD AUTO: 4.47 K/UL — LOW (ref 4.8–10.8)
WBC # FLD AUTO: 9.98 K/UL — SIGNIFICANT CHANGE UP (ref 4.8–10.8)

## 2018-03-15 RX ORDER — AMPICILLIN SODIUM AND SULBACTAM SODIUM 250; 125 MG/ML; MG/ML
1.5 INJECTION, POWDER, FOR SUSPENSION INTRAMUSCULAR; INTRAVENOUS EVERY 6 HOURS
Qty: 0 | Refills: 0 | Status: DISCONTINUED | OUTPATIENT
Start: 2018-03-15 | End: 2018-03-17

## 2018-03-15 RX ADMIN — MEMANTINE HYDROCHLORIDE 10 MILLIGRAM(S): 10 TABLET ORAL at 05:45

## 2018-03-15 RX ADMIN — Medication 500 MILLIGRAM(S): at 11:57

## 2018-03-15 RX ADMIN — Medication 1 TABLET(S): at 11:57

## 2018-03-15 RX ADMIN — AMPICILLIN SODIUM AND SULBACTAM SODIUM 100 GRAM(S): 250; 125 INJECTION, POWDER, FOR SUSPENSION INTRAMUSCULAR; INTRAVENOUS at 23:12

## 2018-03-15 RX ADMIN — AMLODIPINE BESYLATE 10 MILLIGRAM(S): 2.5 TABLET ORAL at 05:45

## 2018-03-15 RX ADMIN — HEPARIN SODIUM 5000 UNIT(S): 5000 INJECTION INTRAVENOUS; SUBCUTANEOUS at 18:14

## 2018-03-15 RX ADMIN — AMPICILLIN SODIUM AND SULBACTAM SODIUM 200 GRAM(S): 250; 125 INJECTION, POWDER, FOR SUSPENSION INTRAMUSCULAR; INTRAVENOUS at 05:45

## 2018-03-15 RX ADMIN — MEMANTINE HYDROCHLORIDE 10 MILLIGRAM(S): 10 TABLET ORAL at 18:14

## 2018-03-15 RX ADMIN — ATORVASTATIN CALCIUM 10 MILLIGRAM(S): 80 TABLET, FILM COATED ORAL at 21:41

## 2018-03-15 RX ADMIN — AMPICILLIN SODIUM AND SULBACTAM SODIUM 100 GRAM(S): 250; 125 INJECTION, POWDER, FOR SUSPENSION INTRAMUSCULAR; INTRAVENOUS at 18:51

## 2018-03-15 RX ADMIN — Medication 25 MILLIGRAM(S): at 05:45

## 2018-03-15 RX ADMIN — SODIUM CHLORIDE 75 MILLILITER(S): 9 INJECTION, SOLUTION INTRAVENOUS at 00:23

## 2018-03-15 RX ADMIN — Medication 50 MICROGRAM(S): at 05:45

## 2018-03-15 RX ADMIN — LOSARTAN POTASSIUM 100 MILLIGRAM(S): 100 TABLET, FILM COATED ORAL at 05:45

## 2018-03-15 RX ADMIN — Medication 81 MILLIGRAM(S): at 11:57

## 2018-03-15 RX ADMIN — HEPARIN SODIUM 5000 UNIT(S): 5000 INJECTION INTRAVENOUS; SUBCUTANEOUS at 05:45

## 2018-03-15 RX ADMIN — ZINC SULFATE TAB 220 MG (50 MG ZINC EQUIVALENT) 220 MILLIGRAM(S): 220 (50 ZN) TAB at 11:57

## 2018-03-15 NOTE — PROGRESS NOTE ADULT - SUBJECTIVE AND OBJECTIVE BOX
SUBJECTIVE:    Patient is a 89y old Female who presents with a chief complaint of R. LE skin tear with purulence. (13 Mar 2018 01:09)    Currently admitted to medicine with the primary diagnosis of Cellulitis     Today is hospital day 2d. This morning she is resting comfortably in bed and reports no new issues or overnight events.     PAST MEDICAL & SURGICAL HISTORY  Dementia without behavioral disturbance, unspecified dementia type  Acquired hypothyroidism  Dyslipidemia  Essential hypertension  History of bilateral knee replacement        ALLERGIES:  No Known Allergies    MEDICATIONS:  STANDING MEDICATIONS  amLODIPine   Tablet 10 milliGRAM(s) Oral daily  ampicillin/sulbactam  IVPB 1.5 Gram(s) IV Intermittent every 6 hours  ascorbic acid 500 milliGRAM(s) Oral daily  aspirin  chewable 81 milliGRAM(s) Oral daily  atorvastatin 10 milliGRAM(s) Oral at bedtime  calcium carbonate  625 mG + Vitamin D (OsCal 250 + D) 1 Tablet(s) Oral daily  heparin  Injectable 5000 Unit(s) SubCutaneous every 12 hours  lactated ringers. 1000 milliLiter(s) IV Continuous <Continuous>  levothyroxine 50 MICROGram(s) Oral daily  losartan 100 milliGRAM(s) Oral daily  memantine 10 milliGRAM(s) Oral two times a day  metoprolol succinate ER 25 milliGRAM(s) Oral daily  zinc sulfate 220 milliGRAM(s) Oral daily    PRN MEDICATIONS  artificial  tears Solution 1 Drop(s) Both EYES every 6 hours PRN  senna 2 Tablet(s) Oral daily PRN  zolpidem 5 milliGRAM(s) Oral at bedtime PRN  zolpidem 5 milliGRAM(s) Oral at bedtime PRN    VITALS:   T(F): 96.2  HR: 63  BP: 131/60  RR: 17  SpO2: 97%    LABS:                        9.1    4.47  )-----------( 188      ( 15 Mar 2018 10:40 )             27.6     03-15    136  |  108  |  19  ----------------------------<  100  4.7   |  16<L>  |  0.8    Ca    7.7<L>      15 Mar 2018 05:58  Phos  4.5     03-15  Mg     2.1     03-15      PT/INR - ( 15 Mar 2018 05:58 )   PT: 14.70 sec;   INR: 1.35 ratio         PTT - ( 15 Mar 2018 05:58 )  PTT:26.2 sec              RADIOLOGY:      < from: Xray Tibia + Fibula 2 Views, Right (03.12.18 @ 22:27) >    Impression: Anterior midline soft tissue ulceration with linear   subcutaneous emphysema extending to the mid third of leg. Correlate   clinically for necrotizing fasciitis. Consider noncontrast leg CT for   further evaluation    < end of copied text >    < from: Xray Chest 1 View-PORTABLE IMMEDIATE (03.12.18 @ 22:26) >  Impression:      Mild bibasilar atelectasis    < end of copied text >    PHYSICAL EXAM:  GEN:  awake, alert  LUNGS: ctab  HEART: systolic murmur  ABD: soft non tender  Skin:  Right lower extremity ulcer with packing. no fluctuation or tenderness.  Clean based ulcer.    left upper extremity break down of skin.

## 2018-03-15 NOTE — CONSULT NOTE ADULT - SUBJECTIVE AND OBJECTIVE BOX
HPI:  89 y/o right-handed white F with PMH history of HTN, HLD, hypothyroidism, dementia, and history of multiple skin tears, presented from home for RLE and LUE skin tears. The patient was assessed by a visiting nurse practitioner, who was concerned about a skin tear on her RLE. She felt the wound looked infected and advised her to come to the hospital for evaluation.  The patient is a poor historian due to history of dementia, and all history was obtained from the son.  Her son states that for the past several days she seemed weaker than usual, and the leg wound, which has been present for "a while," has been looking worse over the past several days. He denies noticing any other symptoms. She has not had any fevers or change in mental status from baseline. He states that she does ambulate with a cane at baseline, but has been feeling too weak to walk over the past few days.  There is no history of falls or trauma to the leg. (13 Mar 2018 01:09)      PAST MEDICAL & SURGICAL HISTORY:  Dementia without behavioral disturbance, unspecified dementia type  Acquired hypothyroidism  Dyslipidemia  Essential hypertension  History of bilateral knee replacement      Hospital Course:  Admitted and placed on IV ampicillin/sulbactam    TODAY'S SUBJECTIVE & REVIEW OF SYMPTOMS:     Constitutional WNL   Cardio WNL   Resp WNL   GI WNL  Heme WNL  Endo WNL  Skin WNL  MSK WNL  Neuro WNL  Cognitive WNL  Psych WNL      MEDICATIONS  (STANDING):  amLODIPine   Tablet 10 milliGRAM(s) Oral daily  ampicillin/sulbactam  IVPB 3 Gram(s) IV Intermittent every 12 hours  ascorbic acid 500 milliGRAM(s) Oral daily  aspirin  chewable 81 milliGRAM(s) Oral daily  atorvastatin 10 milliGRAM(s) Oral at bedtime  calcium carbonate  625 mG + Vitamin D (OsCal 250 + D) 1 Tablet(s) Oral daily  heparin  Injectable 5000 Unit(s) SubCutaneous every 12 hours  levothyroxine 50 MICROGram(s) Oral daily  losartan 100 milliGRAM(s) Oral daily  magnesium sulfate  IVPB 2 Gram(s) IV Intermittent once  memantine 10 milliGRAM(s) Oral two times a day  metoprolol succinate ER 25 milliGRAM(s) Oral daily  silver sulfADIAZINE 1% Cream 1 Application(s) Topical two times a day  zinc sulfate 220 milliGRAM(s) Oral daily    MEDICATIONS  (PRN):  artificial  tears Solution 1 Drop(s) Both EYES every 6 hours PRN Dry Eyes  senna 2 Tablet(s) Oral daily PRN Constipation  zolpidem 5 milliGRAM(s) Oral at bedtime PRN Insomnia  zolpidem 5 milliGRAM(s) Oral at bedtime PRN Insomnia      FAMILY HISTORY:  No pertinent family history in first degree relatives      Allergies    No Known Allergies    Intolerances        SOCIAL HISTORY:    [  ] EtOH  [  ] Smoking  [  ] Substance abuse     Home Environment:  [X  ] Home Alone  [  ] Lives with Family  [ X ] Home Health Aide--24 hours    Dwelling:  [ X ] Apartment  [  ] Private House  [  ] Adult Home  [  ] Skilled Nursing Facility      [  ] Short Term  [ ] Long Term  [ X ] Stairs 2+4 step entry      Elevator [  ]    FUNCTIONAL STATUS PTA: (Check all that apply)  Ambulation: [  X ]Independent    [  ] Dependent     [  ] Non-Ambulatory  Assistive Device: [ X ] SA Cane  [  ]  Q Cane  [  ] Walker  [  ]  Wheelchair  ADL : [ X ] Independent  [  ]  Dependent       Vital Signs Last 24 Hrs  T(C): 36 (13 Mar 2018 09:15), Max: 36.8 (13 Mar 2018 00:26)  T(F): 96.8 (13 Mar 2018 09:15), Max: 98.3 (13 Mar 2018 00:26)  HR: 72 (13 Mar 2018 09:15) (66 - 72)  BP: 134/63 (13 Mar 2018 09:15) (109/82 - 155/70)  BP(mean): --  RR: 17 (13 Mar 2018 09:15) (17 - 20)  SpO2: 98% (13 Mar 2018 09:15) (98% - 100%)      PHYSICAL EXAM: Alert & Oriented X 2, Thai and Yiddish speaking  GENERAL: NAD, well-groomed, well-developed  HEAD:  Atraumatic, Normocephalic  EYES: EOMI, PERRLA, conjunctiva and sclera clear  NECK: Supple, No JVD, Normal thyroid  CHEST/LUNG: Clear to percussion bilaterally; No rales, rhonchi, wheezing, or rubs  HEART: Regular rate and rhythm; No murmurs, rubs, or gallops  ABDOMEN: Soft, Nontender, Nondistended; Bowel sounds present  EXTREMITIES:  2+ Peripheral Pulses, No clubbing, cyanosis, or edema    NERVOUS SYSTEM:  Cranial Nerves 2-12 intact [ X ] Abnormal  [  ]  ROM: WFL all extremities [  ]  Abnormal [ X ]  Motor Strength: WFL all extremities  [  ]  Abnormal [ X ]  Sensation: intact to light touch [ X ] Abnormal [  ]  Reflexes: Symmetric [  ]  Abnormal [  ]    FUNCTIONAL STATUS:  Bed Mobility: Independent [  ]  Supervision [  ]  Needs Assistance [ X ]  N/A [  ]  Transfers: Independent [  ]  Supervision [  ]  Needs Assistance [ X ]  N/A [  ]   Ambulation: Independent [  ]  Supervision [  ]  Needs Assistance [X  ]  N/A [  ]  ADL: Independent [ X ] Requires Assistance [  ] N/A [  ]      LABS:                        10.3   5.69  )-----------( 178      ( 13 Mar 2018 05:59 )             30.3     03-13    137  |  105  |  21<H>  ----------------------------<  94  4.1   |  22  |  1.2    Ca    8.2<L>      13 Mar 2018 05:59  Mg     1.7     03-13    TPro  6.0  /  Alb  2.6<L>  /  TBili  1.1  /  DBili  x   /  AST  19  /  ALT  9   /  AlkPhos  57  03-12    PT/INR - ( 12 Mar 2018 22:21 )   PT: 12.80 sec;   INR: 1.18 ratio         PTT - ( 12 Mar 2018 22:21 )  PTT:27.0 sec      RADIOLOGY & ADDITIONAL STUDIES:    EXAM:  XR CHEST PORTABLE IMMED 1V            PROCEDURE DATE:  03/12/2018            INTERPRETATION:  Clinical History / Reason for exam: Swelling    Comparison : Chest radiograph 3/31/2014.    Technique/Positioning: Satisfactory.    Findings:    Support devices: None.    Cardiac/mediastinum/hilum: Unchanged cardiomegaly and tortuous aorta.    Lung parenchyma/Pleura: Unchanged calcific density overlies the mid   thoracic spine with mild bibasilar atelectasis. Skeleton/soft tissues:   Stable.    Impression:      Mild bibasilar atelectasis        EXAM:  XR TIB FIB AP LAT 2 VIEWS RT            PROCEDURE DATE:  03/12/2018            INTERPRETATION:  Clinical History / Reason for exam: Skin infection    Technique: 2 views of the right leg    Findings: There is anterior mid leg soft tissue ulceration with possible   subcutaneous emphysema to the mid third.    Patient status post right total knee replacement partially imaged. There   are multiple soft tissue rim calcified calcification suggestive of fat   necrosis.    Bony structures demonstrate no fracture or periostitis. No bony erosion   present. Vascular calcifications noted    Impression: Anterior midline soft tissue ulceration with linear   subcutaneous emphysema extending to the mid third of leg. Correlate   clinically for necrotizing fasciitis. Consider noncontrast leg CT for   further evaluation
BEINISSANJAY, CHANDRIKA  89y, Female  Allergy: No Known Allergies      HPI:  89 y/o F with PMH history of HTN, HLD, hypothyroidism, dementia, and history of multiple skin tears, presented from home for RLE and LUE skin tears. The patient was assessed by a visiting nurse practitioner, who was concerned about a skin tear on her RLE. She felt the wound looked infected and advised her to come to the hospital for evaluation.  The patient is a poor historian due to history of dementia, and all history was obtained from the son.  Her son states that for the past several days she seemed weaker than usual, and the leg wound, which has been present for "a while," has been looking worse over the past several days. He denies noticing any other symptoms. She has not had any fevers or change in mental status from baseline. He states that she does ambulate with a cane at baseline, but has been feeling too weak to walk over the past few days.  There is no history of falls or trauma to the leg. (13 Mar 2018 01:09)    FAMILY HISTORY:  No pertinent family history in first degree relatives    PAST MEDICAL & SURGICAL HISTORY:  Dementia without behavioral disturbance, unspecified dementia type  Acquired hypothyroidism  Dyslipidemia  Essential hypertension  History of bilateral knee replacement        VITALS:  T(F): 96.2, Max: 97.5 (03-14-18 @ 21:05)  HR: 55  BP: 142/65  RR: 18Vital Signs Last 24 Hrs  T(C): 35.7 (15 Mar 2018 08:00), Max: 36.4 (14 Mar 2018 21:05)  T(F): 96.2 (15 Mar 2018 08:00), Max: 97.5 (14 Mar 2018 21:05)  HR: 55 (15 Mar 2018 08:00) (55 - 71)  BP: 142/65 (15 Mar 2018 08:00) (113/50 - 155/62)  BP(mean): --  RR: 18 (15 Mar 2018 08:00) (12 - 19)  SpO2: 97% (14 Mar 2018 22:31) (96% - 100%)    TESTS & MEASUREMENTS:                        9.1    4.47  )-----------( 188      ( 15 Mar 2018 10:40 )             27.6     03-15    136  |  108  |  19  ----------------------------<  100  4.7   |  16<L>  |  0.8    Ca    7.7<L>      15 Mar 2018 05:58  Phos  4.5     03-15  Mg     2.1     03-15                RADIOLOGY & ADDITIONAL TESTS:    ANTIBIOTICS:  ampicillin/sulbactam  IVPB 3 Gram(s) IV Intermittent every 12 hours
PROGRESS NOTE   Patient is a 89y old  Female who presents with a chief complaint of R. LE skin tear with purulence. (13 Mar 2018 01:09). Podiatry was consulted to evaluate left hallux DFU.       HPI:  89 y/o F with PMH history of HTN, HLD, hypothyroidism, dementia, and history of multiple skin tears, presented from home for RLE and LUE skin tears. The patient was assessed by a visiting nurse practitioner, who was concerned about a skin tear on her RLE. She felt the wound looked infected and advised her to come to the hospital for evaluation.  The patient is a poor historian due to history of dementia, and all history was obtained from the son.  Her son states that for the past several days she seemed weaker than usual, and the leg wound, which has been present for "a while," has been looking worse over the past several days. He denies noticing any other symptoms. She has not had any fevers or change in mental status from baseline. He states that she does ambulate with a cane at baseline, but has been feeling too weak to walk over the past few days.  There is no history of falls or trauma to the leg. (13 Mar 2018 01:09)      Vital Signs Last 24 Hrs  T(C): 35.7 (15 Mar 2018 15:40), Max: 36.4 (14 Mar 2018 22:05)  T(F): 96.2 (15 Mar 2018 15:40), Max: 97.5 (14 Mar 2018 22:05)  HR: 63 (15 Mar 2018 15:40) (55 - 71)  BP: 131/60 (15 Mar 2018 15:40) (131/60 - 155/62)  BP(mean): --  RR: 17 (15 Mar 2018 15:40) (14 - 19)  SpO2: 97% (14 Mar 2018 22:31) (97% - 100%)                          9.1    4.47  )-----------( 188      ( 15 Mar 2018 10:40 )             27.6               03-15    136  |  108  |  19  ----------------------------<  100  4.7   |  16<L>  |  0.8    Ca    7.7<L>      15 Mar 2018 05:58  Phos  4.5     03-15  Mg     2.1     03-15        PHYSICAL EXAM  GEN: BEINISHESCHANDRIKA is a pleasant well-nourished, well developed 89y Female in no acute distress, alert awake, and oriented to person, place and time.   LE Focused:        Vasc:   - DP/PT pulses 1/4 B/L  - cap fill < 3 sec B/L  - skin temp warm to warm, prox to distal    Neuro:  - Gross sensation diminished    Derm:  - Left: hallux DFU and stable eschar on heel  - Right: anterior leg wound down to the level of the muscle    MSK:  - muscular strength 2/5 B/L  - hammer digits 2-4 B/L    Assessment:  1. Left hallux DFU - purulence, - malodor, - erythema and no clinical signs of infection  2. Left heel stable eschar  3. Right anterior leg wound to the level of the muscle/tendon    Plan:  1. pt evaluated @ bedside  2. dressed left hallux wound in betadine/DSD  3. Consult ID/vasc for recs  4. Allevyn to left heel  5. Heel off- applied to B/L heels  6. Plan will be reviewed w/ attending        Assessment

## 2018-03-15 NOTE — DIETITIAN INITIAL EVALUATION ADULT. - PHYSICAL APPEARANCE
BMI 27.5 (using bed scale weight 160lbs-pt. noted to have 2+ edema L,R leg and height 5'4 provided by pt. son), skin: pressure ulcer L buttocks stage II, sacrum stage II, alert & oriented/well nourished

## 2018-03-15 NOTE — DIETITIAN INITIAL EVALUATION ADULT. - FACTORS AFF FOOD INTAKE
lacks dentures, pt. son unsure of last BM, none documented in EMR, no chew/swallow difficulty per pt. son but requesting soft/pureed foods, denies N/V/other (specify)

## 2018-03-15 NOTE — CONSULT NOTE ADULT - EXTREMITIES COMMENTS
Left arm has a noninfected ulcer.  LLE distally with hematoma with debridement with edema/erythema. No pedal pulses.

## 2018-03-15 NOTE — CONSULT NOTE ADULT - ASSESSMENT
IMPRESSION: Rehab for gait disorder    PRECAUTIONS: [  ] Cardiac  [  ] Respiratory  [  ] Seizures [  ] Contact Isolation  [  ] Droplet Isolation  [  ] Other    Weight Bearing Status:  WBAT    RECOMMENDATION:    Out of Bed to Chair     DVT/Decubiti Prophylaxis    REHAB PLAN:     [  X ] Bedside P/T 3-5 times a week   [   ]   Bedside O/T  2-3 times a week             [   ] No Rehab Therapy Indicated                   [   ]  Speech Therapy   Conditioning/ROM                                    ADL  Bed Mobility                                               Conditioning/ROM  Transfers                                                     Bed Mobility  Sitting /Standing Balance                         Transfers                                        Gait Training                                               Sitting/Standing Balance  Stair Training [   ]Applicable                    Home equipment Eval                                                                        Splinting  [   ] Only      GOALS:   ADL   [   ]   Independent                    Transfers  [   ] Independent                          Ambulation  [   ] Independent     [  F  ] With device                            [   ]  CG                                                         [   ]  CG                                                                  [   ] CG                            [    ] Min A                                                   [   ] Min A                                                              [ F  ] Min  A          DISCHARGE PLAN:   [   ]  Good candidate for Intensive Rehabilitation/Hospital based-4A [facility]                                             Will tolerate 3hrs Intensive Rehab Daily                                       [  X  ]  Short Term Rehab in Skilled Nursing Facility vs. Long Term Care                                       [    ]  Home with Outpatient or  services                                         [    ]  Possible Candidate for Intensive Hospital based Rehab      Thank you.
Hematoma LLE without evidence of cellulitis/fascitis/asbscess/Om.  Hematoma quite deep. Will cover with antibiotics.    Local care with silverdene.  Unasyn1.5 gm iv q6h  From ID standpoint could change to po at nay time.

## 2018-03-15 NOTE — DIETITIAN INITIAL EVALUATION ADULT. - NS FNS WEIGHT CHANGE REASON
unintentional/pt. son unsure about amount or time frame of weight loss, states pt. used to be 180lbs 2-3 years or so ago

## 2018-03-15 NOTE — DIETITIAN INITIAL EVALUATION ADULT. - OTHER INFO
Initial assessment for consult: pressure ulcer stage II or greater, p/w Rt leg ulcer, debridement 3/14, cellulitis of R LE, hospital course complicated by new onset A fib

## 2018-03-15 NOTE — PROGRESS NOTE ADULT - SUBJECTIVE AND OBJECTIVE BOX
CHANDRIKA OSMAN    Patient is a 89y old  Female who presents with a chief complaint of R. LE skin tear with purulence after fall. (13 Mar 2018 01:09)    INTERVAL HPI/OVERNIGHT EVENTS: S/p debridement on 3/14. No complaints except pt states she is very cold.     PHYSICAL EXAM:  T(C): 35.7, Max: 36.4 (03-14-18 @ 21:05)  HR: 55 (55 - 71)  BP: 142/65 (113/50 - 155/62)  RR: 18 (12 - 19)  SpO2: 97% (96% - 100%)    GENERAL: NAD, shivering  NECK: Supple, No JVD  CHEST/LUNG: No rales, rhonchi, wheezing  HEART: Regular rate and rhythm; No murmurs  GI/ABDOMEN: Soft, Nontender, Nondistended; Bowel sounds present  EXTREMITIES:  LUE with dressing, RLE with dressing, LLE with chronic skin discoloration and healed ulcers.  SKIN: No rashes or lesions  NERVOUS SYSTEM:  Alert & Oriented X2-3, no focal deficit     Consultant(s) Notes Reviewed by me.   Care Discussed with Consultants/Other Providers     LABS:                        9.1    4.47  )-----------( 188      ( 15 Mar 2018 10:40 )             27.6     03-15    136  |  108  |  19  ----------------------------<  100  4.7   |  16<L>  |  0.8    Ca    7.7<L>      15 Mar 2018 05:58  Phos  4.5     03-15  Mg     2.1     03-15      PT/INR - ( 15 Mar 2018 05:58 )   PT: 14.70 sec;   INR: 1.35 ratio         PTT - ( 15 Mar 2018 05:58 )  PTT:26.2 sec    MEDICATIONS  (STANDING):  amLODIPine   Tablet 10 milliGRAM(s) Oral daily  ampicillin/sulbactam  IVPB 3 Gram(s) IV Intermittent every 12 hours  ascorbic acid 500 milliGRAM(s) Oral daily  aspirin  chewable 81 milliGRAM(s) Oral daily  atorvastatin 10 milliGRAM(s) Oral at bedtime  calcium carbonate  625 mG + Vitamin D (OsCal 250 + D) 1 Tablet(s) Oral daily  heparin  Injectable 5000 Unit(s) SubCutaneous every 12 hours  lactated ringers. 1000 milliLiter(s) (75 mL/Hr) IV Continuous <Continuous>  levothyroxine 50 MICROGram(s) Oral daily  losartan 100 milliGRAM(s) Oral daily  memantine 10 milliGRAM(s) Oral two times a day  metoprolol succinate ER 25 milliGRAM(s) Oral daily  zinc sulfate 220 milliGRAM(s) Oral daily    MEDICATIONS  (PRN):  artificial  tears Solution 1 Drop(s) Both EYES every 6 hours PRN Dry Eyes  senna 2 Tablet(s) Oral daily PRN Constipation  zolpidem 5 milliGRAM(s) Oral at bedtime PRN Insomnia  zolpidem 5 milliGRAM(s) Oral at bedtime PRN Insomnia

## 2018-03-16 LAB
ANION GAP SERPL CALC-SCNC: 11 MMOL/L — SIGNIFICANT CHANGE UP (ref 7–14)
BASOPHILS # BLD AUTO: 0.02 K/UL — SIGNIFICANT CHANGE UP (ref 0–0.2)
BASOPHILS NFR BLD AUTO: 0.2 % — SIGNIFICANT CHANGE UP (ref 0–1)
BUN SERPL-MCNC: 32 MG/DL — HIGH (ref 10–20)
CALCIUM SERPL-MCNC: 8.2 MG/DL — LOW (ref 8.5–10.1)
CHLORIDE SERPL-SCNC: 103 MMOL/L — SIGNIFICANT CHANGE UP (ref 98–110)
CO2 SERPL-SCNC: 22 MMOL/L — SIGNIFICANT CHANGE UP (ref 17–32)
CREAT SERPL-MCNC: 1.1 MG/DL — SIGNIFICANT CHANGE UP (ref 0.7–1.5)
EOSINOPHIL # BLD AUTO: 0.07 K/UL — SIGNIFICANT CHANGE UP (ref 0–0.7)
EOSINOPHIL NFR BLD AUTO: 0.8 % — SIGNIFICANT CHANGE UP (ref 0–8)
GLUCOSE SERPL-MCNC: 98 MG/DL — SIGNIFICANT CHANGE UP (ref 70–110)
HCT VFR BLD CALC: 28.3 % — LOW (ref 37–47)
HGB BLD-MCNC: 9.6 G/DL — LOW (ref 12–16)
IMM GRANULOCYTES NFR BLD AUTO: 0.4 % — HIGH (ref 0.1–0.3)
LYMPHOCYTES # BLD AUTO: 1.61 K/UL — SIGNIFICANT CHANGE UP (ref 1.2–3.4)
LYMPHOCYTES # BLD AUTO: 18 % — LOW (ref 20.5–51.1)
MAGNESIUM SERPL-MCNC: 1.9 MG/DL — SIGNIFICANT CHANGE UP (ref 1.8–2.4)
MCHC RBC-ENTMCNC: 32.3 PG — HIGH (ref 27–31)
MCHC RBC-ENTMCNC: 33.9 G/DL — SIGNIFICANT CHANGE UP (ref 32–37)
MCV RBC AUTO: 95.3 FL — SIGNIFICANT CHANGE UP (ref 81–99)
MONOCYTES # BLD AUTO: 0.72 K/UL — HIGH (ref 0.1–0.6)
MONOCYTES NFR BLD AUTO: 8 % — SIGNIFICANT CHANGE UP (ref 1.7–9.3)
NEUTROPHILS # BLD AUTO: 6.49 K/UL — SIGNIFICANT CHANGE UP (ref 1.4–6.5)
NEUTROPHILS NFR BLD AUTO: 72.6 % — SIGNIFICANT CHANGE UP (ref 42.2–75.2)
NRBC # BLD: 0 /100 WBCS — SIGNIFICANT CHANGE UP (ref 0–0)
PLATELET # BLD AUTO: 197 K/UL — SIGNIFICANT CHANGE UP (ref 130–400)
POTASSIUM SERPL-MCNC: 5 MMOL/L — SIGNIFICANT CHANGE UP (ref 3.5–5)
POTASSIUM SERPL-SCNC: 5 MMOL/L — SIGNIFICANT CHANGE UP (ref 3.5–5)
RBC # BLD: 2.97 M/UL — LOW (ref 4.2–5.4)
RBC # FLD: 12.4 % — SIGNIFICANT CHANGE UP (ref 11.5–14.5)
SODIUM SERPL-SCNC: 136 MMOL/L — SIGNIFICANT CHANGE UP (ref 135–146)
WBC # BLD: 8.95 K/UL — SIGNIFICANT CHANGE UP (ref 4.8–10.8)
WBC # FLD AUTO: 8.95 K/UL — SIGNIFICANT CHANGE UP (ref 4.8–10.8)

## 2018-03-16 RX ADMIN — Medication 1 TABLET(S): at 12:01

## 2018-03-16 RX ADMIN — HEPARIN SODIUM 5000 UNIT(S): 5000 INJECTION INTRAVENOUS; SUBCUTANEOUS at 17:47

## 2018-03-16 RX ADMIN — SODIUM CHLORIDE 75 MILLILITER(S): 9 INJECTION, SOLUTION INTRAVENOUS at 05:23

## 2018-03-16 RX ADMIN — ATORVASTATIN CALCIUM 10 MILLIGRAM(S): 80 TABLET, FILM COATED ORAL at 21:28

## 2018-03-16 RX ADMIN — AMLODIPINE BESYLATE 10 MILLIGRAM(S): 2.5 TABLET ORAL at 05:23

## 2018-03-16 RX ADMIN — MEMANTINE HYDROCHLORIDE 10 MILLIGRAM(S): 10 TABLET ORAL at 05:24

## 2018-03-16 RX ADMIN — AMPICILLIN SODIUM AND SULBACTAM SODIUM 100 GRAM(S): 250; 125 INJECTION, POWDER, FOR SUSPENSION INTRAMUSCULAR; INTRAVENOUS at 17:50

## 2018-03-16 RX ADMIN — ZINC SULFATE TAB 220 MG (50 MG ZINC EQUIVALENT) 220 MILLIGRAM(S): 220 (50 ZN) TAB at 12:01

## 2018-03-16 RX ADMIN — Medication 50 MICROGRAM(S): at 05:24

## 2018-03-16 RX ADMIN — MEMANTINE HYDROCHLORIDE 10 MILLIGRAM(S): 10 TABLET ORAL at 17:50

## 2018-03-16 RX ADMIN — Medication 81 MILLIGRAM(S): at 12:01

## 2018-03-16 RX ADMIN — Medication 500 MILLIGRAM(S): at 12:01

## 2018-03-16 RX ADMIN — LOSARTAN POTASSIUM 100 MILLIGRAM(S): 100 TABLET, FILM COATED ORAL at 05:24

## 2018-03-16 RX ADMIN — AMPICILLIN SODIUM AND SULBACTAM SODIUM 100 GRAM(S): 250; 125 INJECTION, POWDER, FOR SUSPENSION INTRAMUSCULAR; INTRAVENOUS at 05:24

## 2018-03-16 RX ADMIN — AMPICILLIN SODIUM AND SULBACTAM SODIUM 100 GRAM(S): 250; 125 INJECTION, POWDER, FOR SUSPENSION INTRAMUSCULAR; INTRAVENOUS at 12:01

## 2018-03-16 RX ADMIN — Medication 25 MILLIGRAM(S): at 05:23

## 2018-03-16 RX ADMIN — HEPARIN SODIUM 5000 UNIT(S): 5000 INJECTION INTRAVENOUS; SUBCUTANEOUS at 05:24

## 2018-03-16 NOTE — PROGRESS NOTE ADULT - ATTENDING COMMENTS
89 yo F with necrotizing skin tear on RLE and LUE. S/p necrotic tissue debridement on LUE and RLE by burn on 03/14.    Pt sates she feels better overall, doesn't have any specific complaints. Denies pain.     Vitals better today, hypothermia resolved.    Surgical Cx - few E. fecalis                          9.6    8.95  )-----------( 197      ( 16 Mar 2018 07:31 )             28.3       1. Presumable sepsis (hypothermia and leukopenia) due to skin infection.  - improving  - dc fluids  - continue Unasyn IV for now, will switch to PO on discharge  - f/u BCx x2  - wound care     2. Chronic A. fib, rate controlled.  - not a candidate for AC due to age, risk of falls and family wish.    3. HTN  4. Hypothyroidism.  5. Age related debility    Prophylactic measures  DVT ppx with Heparin 5000 SQ   GI ppx not indicated  Heart healthy diet    DNR/DNI    Dispo - dc planning to STR in SNF. 89 yo F with necrotizing skin tear on RLE and LUE. S/p necrotic tissue debridement on LUE and RLE by burn on 03/14.    Pt sates she feels better overall, doesn't have any specific complaints. Denies pain.     Vitals better today, hypothermia resolved.    Surgical Cx - few E. fecalis                          9.6    8.95  )-----------( 197      ( 16 Mar 2018 07:31 )             28.3       1. Presumable sepsis (hypothermia and leukopenia) due to skin infection.  - improving  - dc fluids  - continue Unasyn IV for now, will switch to PO on discharge  - f/u BCx x2  - wound care     2. Chronic A. fib, rate controlled.  - not a candidate for AC due to age, risk of falls and family wish.    3. 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    4. Hypothyroidism.  5. HTN  6. Age related debility    Prophylactic measures  DVT ppx with Heparin 5000 SQ   GI ppx not indicated  Heart healthy diet    DNR/DNI    Dispo - dc planning to STR in SNF.

## 2018-03-16 NOTE — PROGRESS NOTE ADULT - SUBJECTIVE AND OBJECTIVE BOX
JACQUI, CHANDRIKA  89y, Female      OVERNIGHT EVENTS:    none    VITALS:  T(F): 97, Max: 97.6 (03-16-18 @ 00:00)  HR: 61  BP: 145/63  RR: 18Vital Signs Last 24 Hrs  T(C): 36.1 (16 Mar 2018 07:37), Max: 36.4 (16 Mar 2018 00:00)  T(F): 97 (16 Mar 2018 07:37), Max: 97.6 (16 Mar 2018 00:00)  HR: 61 (16 Mar 2018 07:37) (58 - 63)  BP: 145/63 (16 Mar 2018 07:37) (122/59 - 145/63)  BP(mean): --  RR: 18 (16 Mar 2018 07:37) (16 - 18)  SpO2: --    TESTS & MEASUREMENTS:                        9.6    8.95  )-----------( 197      ( 16 Mar 2018 07:31 )             28.3     03-16    136  |  103  |  32<H>  ----------------------------<  98  5.0   |  22  |  1.1    Ca    8.2<L>      16 Mar 2018 07:31  Phos  4.5     03-15  Mg     1.9     03-16          Culture - Surgical Swab (collected 03-14-18 @ 20:21)  Source: .Surgical Swab left arm  Preliminary Report (03-16-18 @ 09:40):    No growth            RADIOLOGY & ADDITIONAL TESTS:    ANTIBIOTICS:  ampicillin/sulbactam  IVPB 1.5 Gram(s) IV Intermittent every 6 hours

## 2018-03-17 LAB
ANION GAP SERPL CALC-SCNC: 14 MMOL/L — SIGNIFICANT CHANGE UP (ref 7–14)
BASOPHILS # BLD AUTO: 0.05 K/UL — SIGNIFICANT CHANGE UP (ref 0–0.2)
BASOPHILS NFR BLD AUTO: 0.7 % — SIGNIFICANT CHANGE UP (ref 0–1)
BLD GP AB SCN SERPL QL: SIGNIFICANT CHANGE UP
BUN SERPL-MCNC: 34 MG/DL — HIGH (ref 10–20)
CALCIUM SERPL-MCNC: 7.7 MG/DL — LOW (ref 8.5–10.1)
CHLORIDE SERPL-SCNC: 104 MMOL/L — SIGNIFICANT CHANGE UP (ref 98–110)
CO2 SERPL-SCNC: 22 MMOL/L — SIGNIFICANT CHANGE UP (ref 17–32)
CREAT SERPL-MCNC: 1.2 MG/DL — SIGNIFICANT CHANGE UP (ref 0.7–1.5)
EOSINOPHIL # BLD AUTO: 0.2 K/UL — SIGNIFICANT CHANGE UP (ref 0–0.7)
EOSINOPHIL NFR BLD AUTO: 2.7 % — SIGNIFICANT CHANGE UP (ref 0–8)
GLUCOSE SERPL-MCNC: 86 MG/DL — SIGNIFICANT CHANGE UP (ref 70–110)
HCT VFR BLD CALC: 30.4 % — LOW (ref 37–47)
HGB BLD-MCNC: 10 G/DL — LOW (ref 12–16)
IMM GRANULOCYTES NFR BLD AUTO: 0.4 % — HIGH (ref 0.1–0.3)
LYMPHOCYTES # BLD AUTO: 1.77 K/UL — SIGNIFICANT CHANGE UP (ref 1.2–3.4)
LYMPHOCYTES # BLD AUTO: 24.1 % — SIGNIFICANT CHANGE UP (ref 20.5–51.1)
MAGNESIUM SERPL-MCNC: 1.8 MG/DL — SIGNIFICANT CHANGE UP (ref 1.8–2.4)
MCHC RBC-ENTMCNC: 31.9 PG — HIGH (ref 27–31)
MCHC RBC-ENTMCNC: 32.9 G/DL — SIGNIFICANT CHANGE UP (ref 32–37)
MCV RBC AUTO: 97.1 FL — SIGNIFICANT CHANGE UP (ref 81–99)
MONOCYTES # BLD AUTO: 0.87 K/UL — HIGH (ref 0.1–0.6)
MONOCYTES NFR BLD AUTO: 11.9 % — HIGH (ref 1.7–9.3)
NEUTROPHILS # BLD AUTO: 4.42 K/UL — SIGNIFICANT CHANGE UP (ref 1.4–6.5)
NEUTROPHILS NFR BLD AUTO: 60.2 % — SIGNIFICANT CHANGE UP (ref 42.2–75.2)
NRBC # BLD: 0 /100 WBCS — SIGNIFICANT CHANGE UP (ref 0–0)
PHOSPHATE SERPL-MCNC: 2.7 MG/DL — SIGNIFICANT CHANGE UP (ref 2.1–4.9)
PLATELET # BLD AUTO: 219 K/UL — SIGNIFICANT CHANGE UP (ref 130–400)
POTASSIUM SERPL-MCNC: 5.3 MMOL/L — HIGH (ref 3.5–5)
POTASSIUM SERPL-SCNC: 5.3 MMOL/L — HIGH (ref 3.5–5)
RBC # BLD: 3.13 M/UL — LOW (ref 4.2–5.4)
RBC # FLD: 12.6 % — SIGNIFICANT CHANGE UP (ref 11.5–14.5)
SODIUM SERPL-SCNC: 140 MMOL/L — SIGNIFICANT CHANGE UP (ref 135–146)
TYPE + AB SCN PNL BLD: SIGNIFICANT CHANGE UP
WBC # BLD: 7.34 K/UL — SIGNIFICANT CHANGE UP (ref 4.8–10.8)
WBC # FLD AUTO: 7.34 K/UL — SIGNIFICANT CHANGE UP (ref 4.8–10.8)

## 2018-03-17 RX ORDER — CEFPODOXIME PROXETIL 100 MG
100 TABLET ORAL EVERY 12 HOURS
Qty: 0 | Refills: 0 | Status: DISCONTINUED | OUTPATIENT
Start: 2018-03-17 | End: 2018-03-20

## 2018-03-17 RX ADMIN — Medication 100 MILLIGRAM(S): at 11:35

## 2018-03-17 RX ADMIN — LOSARTAN POTASSIUM 100 MILLIGRAM(S): 100 TABLET, FILM COATED ORAL at 05:49

## 2018-03-17 RX ADMIN — Medication 50 MICROGRAM(S): at 05:49

## 2018-03-17 RX ADMIN — HEPARIN SODIUM 5000 UNIT(S): 5000 INJECTION INTRAVENOUS; SUBCUTANEOUS at 05:47

## 2018-03-17 RX ADMIN — Medication 25 MILLIGRAM(S): at 05:48

## 2018-03-17 RX ADMIN — ZINC SULFATE TAB 220 MG (50 MG ZINC EQUIVALENT) 220 MILLIGRAM(S): 220 (50 ZN) TAB at 11:37

## 2018-03-17 RX ADMIN — AMPICILLIN SODIUM AND SULBACTAM SODIUM 100 GRAM(S): 250; 125 INJECTION, POWDER, FOR SUSPENSION INTRAMUSCULAR; INTRAVENOUS at 05:52

## 2018-03-17 RX ADMIN — AMPICILLIN SODIUM AND SULBACTAM SODIUM 100 GRAM(S): 250; 125 INJECTION, POWDER, FOR SUSPENSION INTRAMUSCULAR; INTRAVENOUS at 00:07

## 2018-03-17 RX ADMIN — AMLODIPINE BESYLATE 10 MILLIGRAM(S): 2.5 TABLET ORAL at 05:49

## 2018-03-17 RX ADMIN — Medication 81 MILLIGRAM(S): at 11:35

## 2018-03-17 RX ADMIN — Medication 100 MILLIGRAM(S): at 20:54

## 2018-03-17 RX ADMIN — Medication 500 MILLIGRAM(S): at 11:35

## 2018-03-17 RX ADMIN — MEMANTINE HYDROCHLORIDE 10 MILLIGRAM(S): 10 TABLET ORAL at 05:48

## 2018-03-17 RX ADMIN — MEMANTINE HYDROCHLORIDE 10 MILLIGRAM(S): 10 TABLET ORAL at 17:11

## 2018-03-17 RX ADMIN — Medication 1 TABLET(S): at 12:26

## 2018-03-17 RX ADMIN — ATORVASTATIN CALCIUM 10 MILLIGRAM(S): 80 TABLET, FILM COATED ORAL at 21:23

## 2018-03-17 RX ADMIN — HEPARIN SODIUM 5000 UNIT(S): 5000 INJECTION INTRAVENOUS; SUBCUTANEOUS at 17:11

## 2018-03-17 NOTE — PROGRESS NOTE ADULT - SUBJECTIVE AND OBJECTIVE BOX
SUBJECTIVE:    Patient is a 89y old Female who presents with a chief complaint of R. LE skin tear with purulence. (13 Mar 2018 01:09)    Currently admitted to medicine with the primary diagnosis of Cellulitis     Today is hospital day 4d. This morning she is resting comfortably in bed and reports no new issues or overnight events.     PAST MEDICAL & SURGICAL HISTORY  Dementia without behavioral disturbance, unspecified dementia type  Acquired hypothyroidism  Dyslipidemia  Essential hypertension  History of bilateral knee replacement        ALLERGIES:  No Known Allergies    MEDICATIONS:  STANDING MEDICATIONS  amLODIPine   Tablet 10 milliGRAM(s) Oral daily  ampicillin/sulbactam  IVPB 1.5 Gram(s) IV Intermittent every 6 hours  ascorbic acid 500 milliGRAM(s) Oral daily  aspirin  chewable 81 milliGRAM(s) Oral daily  atorvastatin 10 milliGRAM(s) Oral at bedtime  calcium carbonate  625 mG + Vitamin D (OsCal 250 + D) 1 Tablet(s) Oral daily  heparin  Injectable 5000 Unit(s) SubCutaneous every 12 hours  levothyroxine 50 MICROGram(s) Oral daily  losartan 100 milliGRAM(s) Oral daily  memantine 10 milliGRAM(s) Oral two times a day  metoprolol succinate ER 25 milliGRAM(s) Oral daily  zinc sulfate 220 milliGRAM(s) Oral daily    PRN MEDICATIONS  artificial  tears Solution 1 Drop(s) Both EYES every 6 hours PRN  senna 2 Tablet(s) Oral daily PRN  zolpidem 5 milliGRAM(s) Oral at bedtime PRN  zolpidem 5 milliGRAM(s) Oral at bedtime PRN    VITALS:   T(F): 96.3  HR: 57  BP: 139/62  RR: 18  SpO2: --    LABS:                        9.6    8.95  )-----------( 197      ( 16 Mar 2018 07:31 )             28.3     03-16    136  |  103  |  32<H>  ----------------------------<  98  5.0   |  22  |  1.1    Ca    8.2<L>      16 Mar 2018 07:31  Mg     1.9     03-16        Culture - Blood (collected 15 Mar 2018 18:44)  Source: .Blood None  Preliminary Report (17 Mar 2018 02:04):    No growth to date.    Culture - Surgical Swab (collected 14 Mar 2018 20:22)  Source: .Surgical Swab None  Preliminary Report (16 Mar 2018 12:02):    Few Enterococcus faecalis    Culture - Surgical Swab (collected 14 Mar 2018 20:21)  Source: .Surgical Swab left arm  Preliminary Report (16 Mar 2018 09:40):    No growth          RADIOLOGY:    PHYSICAL EXAM:  GEN: awake, alert  LUNGS: ctab  HEART: RRR  ABD: soft, non tender, non distended  EXT: right LE bandage, and left UE bandage

## 2018-03-18 LAB
ANION GAP SERPL CALC-SCNC: 16 MMOL/L — HIGH (ref 7–14)
BUN SERPL-MCNC: 32 MG/DL — HIGH (ref 10–20)
CALCIUM SERPL-MCNC: 8.2 MG/DL — LOW (ref 8.5–10.1)
CHLORIDE SERPL-SCNC: 104 MMOL/L — SIGNIFICANT CHANGE UP (ref 98–110)
CO2 SERPL-SCNC: 23 MMOL/L — SIGNIFICANT CHANGE UP (ref 17–32)
CREAT SERPL-MCNC: 1.1 MG/DL — SIGNIFICANT CHANGE UP (ref 0.7–1.5)
GLUCOSE SERPL-MCNC: 99 MG/DL — SIGNIFICANT CHANGE UP (ref 70–110)
POTASSIUM SERPL-MCNC: 5 MMOL/L — SIGNIFICANT CHANGE UP (ref 3.5–5)
POTASSIUM SERPL-SCNC: 5 MMOL/L — SIGNIFICANT CHANGE UP (ref 3.5–5)
SODIUM SERPL-SCNC: 143 MMOL/L — SIGNIFICANT CHANGE UP (ref 135–146)

## 2018-03-18 RX ORDER — BACITRACIN ZINC 500 UNIT/G
1 OINTMENT IN PACKET (EA) TOPICAL
Qty: 0 | Refills: 0 | Status: DISCONTINUED | OUTPATIENT
Start: 2018-03-18 | End: 2018-03-20

## 2018-03-18 RX ORDER — LABETALOL HCL 100 MG
100 TABLET ORAL ONCE
Qty: 0 | Refills: 0 | Status: COMPLETED | OUTPATIENT
Start: 2018-03-18 | End: 2018-03-18

## 2018-03-18 RX ADMIN — Medication 100 MILLIGRAM(S): at 17:35

## 2018-03-18 RX ADMIN — MEMANTINE HYDROCHLORIDE 10 MILLIGRAM(S): 10 TABLET ORAL at 17:09

## 2018-03-18 RX ADMIN — Medication 100 MILLIGRAM(S): at 11:11

## 2018-03-18 RX ADMIN — HEPARIN SODIUM 5000 UNIT(S): 5000 INJECTION INTRAVENOUS; SUBCUTANEOUS at 17:07

## 2018-03-18 RX ADMIN — ATORVASTATIN CALCIUM 10 MILLIGRAM(S): 80 TABLET, FILM COATED ORAL at 22:05

## 2018-03-18 RX ADMIN — Medication 25 MILLIGRAM(S): at 05:50

## 2018-03-18 RX ADMIN — Medication 1 TABLET(S): at 14:17

## 2018-03-18 RX ADMIN — MEMANTINE HYDROCHLORIDE 10 MILLIGRAM(S): 10 TABLET ORAL at 05:53

## 2018-03-18 RX ADMIN — ZINC SULFATE TAB 220 MG (50 MG ZINC EQUIVALENT) 220 MILLIGRAM(S): 220 (50 ZN) TAB at 11:11

## 2018-03-18 RX ADMIN — Medication 100 MILLIGRAM(S): at 22:05

## 2018-03-18 RX ADMIN — Medication 81 MILLIGRAM(S): at 11:11

## 2018-03-18 RX ADMIN — AMLODIPINE BESYLATE 10 MILLIGRAM(S): 2.5 TABLET ORAL at 05:52

## 2018-03-18 RX ADMIN — Medication 50 MICROGRAM(S): at 05:50

## 2018-03-18 RX ADMIN — HEPARIN SODIUM 5000 UNIT(S): 5000 INJECTION INTRAVENOUS; SUBCUTANEOUS at 05:52

## 2018-03-18 RX ADMIN — LOSARTAN POTASSIUM 100 MILLIGRAM(S): 100 TABLET, FILM COATED ORAL at 05:50

## 2018-03-18 RX ADMIN — Medication 1 APPLICATION(S): at 22:04

## 2018-03-18 RX ADMIN — Medication 500 MILLIGRAM(S): at 11:11

## 2018-03-18 NOTE — PROGRESS NOTE ADULT - ATTENDING COMMENTS
Patient seen and examined independently. Agree with resident note/ history / physical exam and plan of care with following exceptions. Case discussed with house-staff, nursing and patient/care giver.

## 2018-03-18 NOTE — PROGRESS NOTE ADULT - SUBJECTIVE AND OBJECTIVE BOX
SUBJECTIVE:    Patient is a 89y old Female who presents with a chief complaint of R. LE skin tear with purulence. (13 Mar 2018 01:09)    Currently admitted to medicine with the primary diagnosis of Cellulitis     This morning she is resting comfortably in bed and reports no new issues or overnight events.     PAST MEDICAL & SURGICAL HISTORY  Dementia without behavioral disturbance, unspecified dementia type  Acquired hypothyroidism  Dyslipidemia  Essential hypertension  History of bilateral knee replacement        ALLERGIES:  No Known Allergies    MEDICATIONS:  STANDING MEDICATIONS  amLODIPine   Tablet 10 milliGRAM(s) Oral daily  ampicillin/sulbactam  IVPB 1.5 Gram(s) IV Intermittent every 6 hours  ascorbic acid 500 milliGRAM(s) Oral daily  aspirin  chewable 81 milliGRAM(s) Oral daily  atorvastatin 10 milliGRAM(s) Oral at bedtime  calcium carbonate  625 mG + Vitamin D (OsCal 250 + D) 1 Tablet(s) Oral daily  heparin  Injectable 5000 Unit(s) SubCutaneous every 12 hours  levothyroxine 50 MICROGram(s) Oral daily  losartan 100 milliGRAM(s) Oral daily  memantine 10 milliGRAM(s) Oral two times a day  metoprolol succinate ER 25 milliGRAM(s) Oral daily  zinc sulfate 220 milliGRAM(s) Oral daily    PRN MEDICATIONS  artificial  tears Solution 1 Drop(s) Both EYES every 6 hours PRN  senna 2 Tablet(s) Oral daily PRN  zolpidem 5 milliGRAM(s) Oral at bedtime PRN  zolpidem 5 milliGRAM(s) Oral at bedtime PRN    VITALS:   Vital Signs Last 24 Hrs  T(C): 37.1 (18 Mar 2018 07:23), Max: 37.1 (18 Mar 2018 07:23)  T(F): 98.8 (18 Mar 2018 07:23), Max: 98.8 (18 Mar 2018 07:23)  HR: 62 (18 Mar 2018 07:23) (61 - 64)  BP: 138/65 (18 Mar 2018 07:23) (138/65 - 144/63)  RR: 18 (18 Mar 2018 07:23) (18 - 18)    LABS:                        9.6    8.95  )-----------( 197      ( 16 Mar 2018 07:31 )             28.3     03-16    136  |  103  |  32<H>  ----------------------------<  98  5.0   |  22  |  1.1    Ca    8.2<L>      16 Mar 2018 07:31  Mg     1.9     03-16        Culture - Blood (collected 15 Mar 2018 18:44)  Source: .Blood None  Preliminary Report (17 Mar 2018 02:04):    No growth to date.    Culture - Surgical Swab (collected 14 Mar 2018 20:22)  Source: .Surgical Swab None  Preliminary Report (16 Mar 2018 12:02):    Few Enterococcus faecalis    Culture - Surgical Swab (collected 14 Mar 2018 20:21)  Source: .Surgical Swab left arm  Preliminary Report (16 Mar 2018 09:40):    No growth      PHYSICAL EXAM:  GEN: awake, alert  LUNGS: ctab  HEART: RRR  ABD: soft, non tender, non distended  EXT: right LE bandage, and left UE bandage

## 2018-03-19 LAB
ANION GAP SERPL CALC-SCNC: 11 MMOL/L — SIGNIFICANT CHANGE UP (ref 7–14)
BASOPHILS # BLD AUTO: 0.06 K/UL — SIGNIFICANT CHANGE UP (ref 0–0.2)
BASOPHILS NFR BLD AUTO: 0.5 % — SIGNIFICANT CHANGE UP (ref 0–1)
BUN SERPL-MCNC: 27 MG/DL — HIGH (ref 10–20)
CALCIUM SERPL-MCNC: 8 MG/DL — LOW (ref 8.5–10.1)
CHLORIDE SERPL-SCNC: 104 MMOL/L — SIGNIFICANT CHANGE UP (ref 98–110)
CO2 SERPL-SCNC: 25 MMOL/L — SIGNIFICANT CHANGE UP (ref 17–32)
CREAT SERPL-MCNC: 0.9 MG/DL — SIGNIFICANT CHANGE UP (ref 0.7–1.5)
EOSINOPHIL # BLD AUTO: 0.23 K/UL — SIGNIFICANT CHANGE UP (ref 0–0.7)
EOSINOPHIL NFR BLD AUTO: 2 % — SIGNIFICANT CHANGE UP (ref 0–8)
GLUCOSE SERPL-MCNC: 102 MG/DL — SIGNIFICANT CHANGE UP (ref 70–110)
HCT VFR BLD CALC: 28.6 % — LOW (ref 37–47)
HGB BLD-MCNC: 9.6 G/DL — LOW (ref 12–16)
IMM GRANULOCYTES NFR BLD AUTO: 0.5 % — HIGH (ref 0.1–0.3)
LYMPHOCYTES # BLD AUTO: 1.33 K/UL — SIGNIFICANT CHANGE UP (ref 1.2–3.4)
LYMPHOCYTES # BLD AUTO: 11.5 % — LOW (ref 20.5–51.1)
MAGNESIUM SERPL-MCNC: 1.7 MG/DL — LOW (ref 1.8–2.4)
MCHC RBC-ENTMCNC: 32.5 PG — HIGH (ref 27–31)
MCHC RBC-ENTMCNC: 33.6 G/DL — SIGNIFICANT CHANGE UP (ref 32–37)
MCV RBC AUTO: 96.9 FL — SIGNIFICANT CHANGE UP (ref 81–99)
MONOCYTES # BLD AUTO: 0.67 K/UL — HIGH (ref 0.1–0.6)
MONOCYTES NFR BLD AUTO: 5.8 % — SIGNIFICANT CHANGE UP (ref 1.7–9.3)
NEUTROPHILS # BLD AUTO: 9.2 K/UL — HIGH (ref 1.4–6.5)
NEUTROPHILS NFR BLD AUTO: 79.7 % — HIGH (ref 42.2–75.2)
NRBC # BLD: 0 /100 WBCS — SIGNIFICANT CHANGE UP (ref 0–0)
PHOSPHATE SERPL-MCNC: 3.2 MG/DL — SIGNIFICANT CHANGE UP (ref 2.1–4.9)
PLATELET # BLD AUTO: 231 K/UL — SIGNIFICANT CHANGE UP (ref 130–400)
POTASSIUM SERPL-MCNC: 4.9 MMOL/L — SIGNIFICANT CHANGE UP (ref 3.5–5)
POTASSIUM SERPL-SCNC: 4.9 MMOL/L — SIGNIFICANT CHANGE UP (ref 3.5–5)
RBC # BLD: 2.95 M/UL — LOW (ref 4.2–5.4)
RBC # FLD: 12.6 % — SIGNIFICANT CHANGE UP (ref 11.5–14.5)
SODIUM SERPL-SCNC: 140 MMOL/L — SIGNIFICANT CHANGE UP (ref 135–146)
SURGICAL PATHOLOGY STUDY: SIGNIFICANT CHANGE UP
WBC # BLD: 11.55 K/UL — HIGH (ref 4.8–10.8)
WBC # FLD AUTO: 11.55 K/UL — HIGH (ref 4.8–10.8)

## 2018-03-19 RX ADMIN — Medication 50 MICROGRAM(S): at 05:52

## 2018-03-19 RX ADMIN — MEMANTINE HYDROCHLORIDE 10 MILLIGRAM(S): 10 TABLET ORAL at 05:53

## 2018-03-19 RX ADMIN — Medication 100 MILLIGRAM(S): at 12:01

## 2018-03-19 RX ADMIN — Medication 25 MILLIGRAM(S): at 05:51

## 2018-03-19 RX ADMIN — Medication 81 MILLIGRAM(S): at 12:01

## 2018-03-19 RX ADMIN — HEPARIN SODIUM 5000 UNIT(S): 5000 INJECTION INTRAVENOUS; SUBCUTANEOUS at 18:16

## 2018-03-19 RX ADMIN — HEPARIN SODIUM 5000 UNIT(S): 5000 INJECTION INTRAVENOUS; SUBCUTANEOUS at 05:54

## 2018-03-19 RX ADMIN — ATORVASTATIN CALCIUM 10 MILLIGRAM(S): 80 TABLET, FILM COATED ORAL at 21:20

## 2018-03-19 RX ADMIN — Medication 1 TABLET(S): at 12:01

## 2018-03-19 RX ADMIN — MEMANTINE HYDROCHLORIDE 10 MILLIGRAM(S): 10 TABLET ORAL at 18:15

## 2018-03-19 RX ADMIN — Medication 1 APPLICATION(S): at 18:16

## 2018-03-19 RX ADMIN — ZINC SULFATE TAB 220 MG (50 MG ZINC EQUIVALENT) 220 MILLIGRAM(S): 220 (50 ZN) TAB at 12:01

## 2018-03-19 RX ADMIN — LOSARTAN POTASSIUM 100 MILLIGRAM(S): 100 TABLET, FILM COATED ORAL at 05:52

## 2018-03-19 RX ADMIN — AMLODIPINE BESYLATE 10 MILLIGRAM(S): 2.5 TABLET ORAL at 05:51

## 2018-03-19 RX ADMIN — Medication 1 APPLICATION(S): at 06:28

## 2018-03-19 RX ADMIN — Medication 500 MILLIGRAM(S): at 12:01

## 2018-03-19 NOTE — PROGRESS NOTE ADULT - SUBJECTIVE AND OBJECTIVE BOX
CHANDRIKA OSMAN    Patient is a 89y old  Female who presents with a chief complaint of R. LE skin tear with purulence. (13 Mar 2018 01:09)    HPI:  89 y/o F with PMH history of HTN, HLD, hypothyroidism, dementia, and history of multiple skin tears, presented from home for RLE and LUE skin tears. The patient was assessed by a visiting nurse practitioner, who was concerned about a skin tear on her RLE. She felt the wound looked infected and advised her to come to the hospital for evaluation.  The patient is a poor historian due to history of dementia, and all history was obtained from the son.  Her son states that for the past several days she seemed weaker than usual, and the leg wound, which has been present for "a while," has been looking worse over the past several days. He denies noticing any other symptoms. She has not had any fevers or change in mental status from baseline. He states that she does ambulate with a cane at baseline, but has been feeling too weak to walk over the past few days.  There is no history of falls or trauma to the leg. (13 Mar 2018 01:09)  Pt underwent debridement by burn last week.    INTERVAL HPI/OVERNIGHT EVENTS: no events, no complaints    PHYSICAL EXAM:  T(C): 36.8, Max: 36.8 (03-19-18 @ 07:22)  HR: 63 (60 - 63)  BP: 117/54 (117/54 - 131/65)  RR: 18 (14 - 18)    GENERAL: NAD, elderly frail lady  NECK: Supple, No JVD  CHEST/LUNG: No rales, rhonchi, wheezing  HEART: Regular rate and rhythm; No murmurs  GI/ABDOMEN: Soft, Nontender, Nondistended; Bowel sounds present  EXTREMITIES:  Multiple hematomas on UE b/l and LE b/l, no signs of infection on LUE and RLE. No calf tenderness b/l.  SKIN: No rashes or lesions  NERVOUS SYSTEM:  Alert & Oriented X2-3, no focal deficit     LABS:                        9.6    11.55 )-----------( 231      ( 19 Mar 2018 08:39 )             28.6     03-19    140  |  104  |  27<H>  ----------------------------<  102  4.9   |  25  |  0.9    Ca    8.0<L>      19 Mar 2018 08:39  Phos  3.2     03-19  Mg     1.7     03-19    MEDICATIONS  (STANDING):  amLODIPine   Tablet 10 milliGRAM(s) Oral daily  ascorbic acid 500 milliGRAM(s) Oral daily  aspirin  chewable 81 milliGRAM(s) Oral daily  atorvastatin 10 milliGRAM(s) Oral at bedtime  BACItracin   Ointment 1 Application(s) Topical two times a day  calcium carbonate  625 mG + Vitamin D (OsCal 250 + D) 1 Tablet(s) Oral daily  cefpodoxime 100 milliGRAM(s) Oral every 12 hours  heparin  Injectable 5000 Unit(s) SubCutaneous every 12 hours  levothyroxine 50 MICROGram(s) Oral daily  losartan 100 milliGRAM(s) Oral daily  memantine 10 milliGRAM(s) Oral two times a day  metoprolol succinate ER 25 milliGRAM(s) Oral daily  zinc sulfate 220 milliGRAM(s) Oral daily    MEDICATIONS  (PRN):  artificial  tears Solution 1 Drop(s) Both EYES every 6 hours PRN Dry Eyes  senna 2 Tablet(s) Oral daily PRN Constipation  zolpidem 5 milliGRAM(s) Oral at bedtime PRN Insomnia  zolpidem 5 milliGRAM(s) Oral at bedtime PRN Insomnia

## 2018-03-19 NOTE — PROGRESS NOTE ADULT - SUBJECTIVE AND OBJECTIVE BOX
SUBJECTIVE:    Patient is a 89y old Female who presents with a chief complaint of R. LE skin tear with purulence. (13 Mar 2018 01:09)    Currently admitted to medicine with the primary diagnosis of Cellulitis     Today is hospital day 6d. This morning she is resting comfortably in bed and reports no new issues or overnight events.     PAST MEDICAL & SURGICAL HISTORY  Dementia without behavioral disturbance, unspecified dementia type  Acquired hypothyroidism  Dyslipidemia  Essential hypertension  History of bilateral knee replacement      ALLERGIES:  No Known Allergies    MEDICATIONS:  STANDING MEDICATIONS  amLODIPine   Tablet 10 milliGRAM(s) Oral daily  ascorbic acid 500 milliGRAM(s) Oral daily  aspirin  chewable 81 milliGRAM(s) Oral daily  atorvastatin 10 milliGRAM(s) Oral at bedtime  BACItracin   Ointment 1 Application(s) Topical two times a day  calcium carbonate  625 mG + Vitamin D (OsCal 250 + D) 1 Tablet(s) Oral daily  cefpodoxime 100 milliGRAM(s) Oral every 12 hours  heparin  Injectable 5000 Unit(s) SubCutaneous every 12 hours  levothyroxine 50 MICROGram(s) Oral daily  losartan 100 milliGRAM(s) Oral daily  memantine 10 milliGRAM(s) Oral two times a day  metoprolol succinate ER 25 milliGRAM(s) Oral daily  zinc sulfate 220 milliGRAM(s) Oral daily    PRN MEDICATIONS  artificial  tears Solution 1 Drop(s) Both EYES every 6 hours PRN  senna 2 Tablet(s) Oral daily PRN  zolpidem 5 milliGRAM(s) Oral at bedtime PRN  zolpidem 5 milliGRAM(s) Oral at bedtime PRN    VITALS:   T(F): 97.3  HR: 61  BP: 132/59  RR: 18  SpO2: --    LABS:                        9.6    11.55 )-----------( 231      ( 19 Mar 2018 08:39 )             28.6     03-19    140  |  104  |  27<H>  ----------------------------<  102  4.9   |  25  |  0.9    Ca    8.0<L>      19 Mar 2018 08:39  Phos  3.2     03-19  Mg     1.7     03-19      RADIOLOGY:    PHYSICAL EXAM:  GEN: awake, alert  LUNGS: ctab  HEART: systolic ejection murmur  ABD: soft, non tender  EXT: RLE and LUE in ace bandage  NEURO:

## 2018-03-19 NOTE — CHART NOTE - NSCHARTNOTEFT_GEN_A_CORE
Registered Dietitian Follow-Up     Patient Profile Reviewed                           Yes [x]   No []     Nutrition History Previously Obtained        Yes [x]  No []       Pertinent Subjective Information: Pt. fed by son during visit, observed ~25% PO intake at breakfast, per son intake variable. usually better earlier in the day, up to 50%. Pt. does better with soft, pureed foods, has not been drinking much per son- will recommend Ensure pudding vs ensure enlive      Pertinent Medical Interventions: s/p debridement LLE without evidence of cellulitis, fascitis, abscess, local wound care, no further surgical intervention, dispo: DNR/DNI, awaiting placement      Diet order: DASH/TLC ordered, observed pureed tray at breakfast      Anthropometrics:  - Ht.  - Wt. 64.6 kg bed scale not tared, taken by RD on 3/19  - %wt change  - BMI  - IBW     Pertinent Lab Data: no new labs documented      Pertinent Meds: vit C, OsCal, Levothyroxine, Metoprolol, Senna, Mg sulfate Lipitor     Physical Findings:  - Appearance: lethargic  - GI function: no symptoms per pt. son, fecal incontinence noted (last BM 3/18)  - Tubes:  - Oral/Mouth cavity: no symptoms per pt. son   - Skin: pressure ulcer stage II L buttocks, stage II sacrum      Nutrition Requirements (from RD note on 3/15)  Weight Used:     Estimated Energy Needs    Continue [x]  Adjust [] 1698-1981kcal  Adjusted Energy Recommendations:   kcal/day        Estimated Protein Needs    Continue [x]  Adjust [] 68-79g  Adjusted Protein Recommendations:   gm/day        Estimated Fluid Needs        Continue []  Adjust [] 1698-1987ml  Adjusted Fluid Recommendations:   mL/day     Nutrient Intake: variable, 25-50% at most meals per pt. son        [] Previous Nutrition Diagnosis: Increased nutrient needs             [x] Ongoing          [] Resolved    Previously recommended supplements have not been ordered- will contact the resident to activate order      Nutrition Intervention: meals and snacks, medical food supplement      Rec: order Ensure pudding TID, MVI daily      Goal/Expected Outcome: In 3 days pt. to consume at least 50-75% PO and supplement      Indicator/Monitoring: diet order, energy intake, body composition, nutrition focused physical findings

## 2018-03-20 VITALS
SYSTOLIC BLOOD PRESSURE: 176 MMHG | RESPIRATION RATE: 18 BRPM | HEART RATE: 61 BPM | TEMPERATURE: 97 F | DIASTOLIC BLOOD PRESSURE: 74 MMHG

## 2018-03-20 RX ORDER — BACITRACIN ZINC 500 UNIT/G
1 OINTMENT IN PACKET (EA) TOPICAL
Qty: 0 | Refills: 0 | COMMUNITY
Start: 2018-03-20

## 2018-03-20 RX ORDER — CEFPODOXIME PROXETIL 100 MG
1 TABLET ORAL
Qty: 0 | Refills: 0 | COMMUNITY
Start: 2018-03-20

## 2018-03-20 RX ORDER — ZINC SULFATE TAB 220 MG (50 MG ZINC EQUIVALENT) 220 (50 ZN) MG
1 TAB ORAL
Qty: 0 | Refills: 0 | COMMUNITY
Start: 2018-03-20

## 2018-03-20 RX ADMIN — Medication 25 MILLIGRAM(S): at 06:15

## 2018-03-20 RX ADMIN — Medication 100 MILLIGRAM(S): at 00:04

## 2018-03-20 RX ADMIN — Medication 500 MILLIGRAM(S): at 11:27

## 2018-03-20 RX ADMIN — AMLODIPINE BESYLATE 10 MILLIGRAM(S): 2.5 TABLET ORAL at 06:17

## 2018-03-20 RX ADMIN — Medication 81 MILLIGRAM(S): at 11:28

## 2018-03-20 RX ADMIN — Medication 1 TABLET(S): at 13:36

## 2018-03-20 RX ADMIN — ZINC SULFATE TAB 220 MG (50 MG ZINC EQUIVALENT) 220 MILLIGRAM(S): 220 (50 ZN) TAB at 11:29

## 2018-03-20 RX ADMIN — Medication 50 MICROGRAM(S): at 06:15

## 2018-03-20 RX ADMIN — Medication 100 MILLIGRAM(S): at 11:28

## 2018-03-20 RX ADMIN — HEPARIN SODIUM 5000 UNIT(S): 5000 INJECTION INTRAVENOUS; SUBCUTANEOUS at 06:18

## 2018-03-20 RX ADMIN — MEMANTINE HYDROCHLORIDE 10 MILLIGRAM(S): 10 TABLET ORAL at 06:17

## 2018-03-20 RX ADMIN — LOSARTAN POTASSIUM 100 MILLIGRAM(S): 100 TABLET, FILM COATED ORAL at 06:15

## 2018-03-20 RX ADMIN — Medication 1 APPLICATION(S): at 06:11

## 2018-03-20 NOTE — PROGRESS NOTE ADULT - SUBJECTIVE AND OBJECTIVE BOX
CHANDRIKA OSMAN    Patient is a 89y old  Female who presents with a chief complaint of R. LE skin tear with purulence. (13 Mar 2018 01:09)    HPI:  89 y/o F with PMH history of HTN, HLD, hypothyroidism, dementia, and history of multiple skin tears, presented from home for RLE and LUE skin tears. The patient was assessed by a visiting nurse practitioner, who was concerned about a skin tear on her RLE. She felt the wound looked infected and advised her to come to the hospital for evaluation.  The patient is a poor historian due to history of dementia, and all history was obtained from the son.  Her son states that for the past several days she seemed weaker than usual, and the leg wound, which has been present for "a while," has been looking worse over the past several days. He denies noticing any other symptoms. She has not had any fevers or change in mental status from baseline. He states that she does ambulate with a cane at baseline, but has been feeling too weak to walk over the past few days.  There is no history of falls or trauma to the leg. (13 Mar 2018 01:09)  Pt underwent debridement by burn last week.    INTERVAL HPI/OVERNIGHT EVENTS: no events, no new complaints.    PHYSICAL EXAM:  T(C): 36.6, Max: 36.6 (03-20-18 @ 07:43)  HR: 63 (61 - 65)  BP: 157/70 (125/55 - 157/70)  RR: 18 (18 - 18)    GENERAL: NAD, elderly frail lady  NECK: Supple, No JVD  CHEST/LUNG: No rales, rhonchi, wheezing  HEART: Regular rate and rhythm; No murmurs  GI/ABDOMEN: Soft, Nontender, Nondistended; Bowel sounds present  EXTREMITIES:  Multiple hematomas on UE b/l and LE b/l, no signs of infection on LUE and RLE. No calf tenderness b/l.  SKIN: No rashes or lesions  NERVOUS SYSTEM:  Alert & Oriented X2-3, no focal deficit     LABS: no new labs    MEDICATIONS  (STANDING):  amLODIPine   Tablet 10 milliGRAM(s) Oral daily  ascorbic acid 500 milliGRAM(s) Oral daily  aspirin  chewable 81 milliGRAM(s) Oral daily  atorvastatin 10 milliGRAM(s) Oral at bedtime  BACItracin   Ointment 1 Application(s) Topical two times a day  calcium carbonate  625 mG + Vitamin D (OsCal 250 + D) 1 Tablet(s) Oral daily  cefpodoxime 100 milliGRAM(s) Oral every 12 hours  heparin  Injectable 5000 Unit(s) SubCutaneous every 12 hours  levothyroxine 50 MICROGram(s) Oral daily  losartan 100 milliGRAM(s) Oral daily  memantine 10 milliGRAM(s) Oral two times a day  metoprolol succinate ER 25 milliGRAM(s) Oral daily  zinc sulfate 220 milliGRAM(s) Oral daily    MEDICATIONS  (PRN):  artificial  tears Solution 1 Drop(s) Both EYES every 6 hours PRN Dry Eyes  senna 2 Tablet(s) Oral daily PRN Constipation  zolpidem 5 milliGRAM(s) Oral at bedtime PRN Insomnia  zolpidem 5 milliGRAM(s) Oral at bedtime PRN Insomnia

## 2018-03-20 NOTE — PROGRESS NOTE ADULT - NSHPATTENDINGPLANDISCUSS_GEN_ALL_CORE
residents, nursing, , patient
patient and her son, all questions answered

## 2018-03-20 NOTE — DISCHARGE NOTE ADULT - HOSPITAL COURSE
89 y/o F with PMH history of HTN, HLD, hypothyroidism, dementia, and history of multiple skin tears, presented from home for RLE and LUE skin tears.  Pt was found to have hematoma in RLE and LUE without any of necrotizing fascitis, abscess or Osteomyelitis.  She had a debridement on 3/14/2018 - sharp excisional debridement in right lower leg [id number] and left arm 10x5 cm.  She has managed with IV abx which were switched to PO antibiotics: PO Vantin 100 mg q12 for 7 days to end on 3/24.  Pt is stable for discharge.    During hospital stay, advanced directives were discussed with patient's family and pt has been made DNR/ DNI.

## 2018-03-20 NOTE — DISCHARGE NOTE ADULT - PATIENT PORTAL LINK FT
You can access the [facility] Patient Portal, offered by [facility], by registering with the following website: [email or web address]

## 2018-03-20 NOTE — DISCHARGE NOTE ADULT - MEDICATION SUMMARY - MEDICATIONS TO TAKE
I will START or STAY ON the medications listed below when I get home from the hospital:    aspirin 81 mg oral tablet, chewable  -- 1 tab(s) by mouth once a day  -- Indication: For general cardiovascular health/ pain    olmesartan 40 mg oral tablet  -- 1 tab(s) by mouth once a day  -- Indication: For Hypertension    cetirizine 10 mg oral tablet  -- 1 tab(s) by mouth once a day  -- Indication: For Allergies    lovastatin 40 mg oral tablet  -- 1 tab(s) by mouth once a day (at bedtime)  -- Indication: For Hypercholesterolemia    zolpidem 10 mg oral tablet  -- 1 tab(s) by mouth once a day (at bedtime)  -- Indication: For Sleepign agent    metoprolol succinate 25 mg oral tablet, extended release  -- 1 tab(s) by mouth once a day  -- Indication: For Hypertension/ Atrial fibrillation    amLODIPine 10 mg oral tablet  -- 1 tab(s) by mouth once a day  -- Indication: For Hypertension    cefpodoxime 100 mg oral tablet  -- 1 tab(s) by mouth every 12 hours till 3/24. Last dose on 3/24  -- Indication: For For Cellulitis. End on 3/24    bacitracin 500 units/g topical ointment  -- 1 application on skin 2 times a day  -- Indication: For For skin- cellulitis    Silvadene 1% topical cream  -- Apply on skin to affected area 2 times a day  -- Indication: For For skin - cellulitis    Senna 8.6 mg oral tablet  -- 2 tab(s) by mouth once a day, As Needed  -- Indication: For Constipation    zinc sulfate 220 mg oral capsule  -- 1 cap(s) by mouth once a day  -- Indication: For Supplement    memantine 10 mg oral tablet  -- 1 tab(s) by mouth 2 times a day  -- Indication: For Dementia without behavioral disturbance, unspecified dementia type    Artificial Tears ophthalmic solution  -- 1 dose(s) in each eye every 6 hours, As Needed  -- Indication: For Eye drops    esomeprazole 40 mg oral delayed release capsule  -- 1 cap(s) by mouth once a day  -- Indication: For GERD    Synthroid 50 mcg (0.05 mg) oral tablet  -- 1 tab(s) by mouth once a day  -- Indication: For Hypothyroidism    Calcium 600+D 600 mg-200 intl units oral tablet  -- 1 tab(s) by mouth once a day  -- Indication: For Supplements    Vitamin C 500 mg oral tablet  -- 1 tab(s) by mouth once a day  -- Indication: For Supplements

## 2018-03-20 NOTE — DISCHARGE NOTE ADULT - PLAN OF CARE
Resolution Continue with PO antibiotics- Vantin till 3/24 No anticoagulation Continue with medications Maintenance

## 2018-03-20 NOTE — PROGRESS NOTE ADULT - ASSESSMENT
1. Skin tears with superimposed infection.  - improving  - off IV ABx, switched to PO ABX.   - wound care  - Wound cx - E. fecalis     2. Chronic A. fib, rate controlled.  - not a candidate for AC due to age, risk of falls and family wish.    3. Acute on chronic anemia  - stable Hb, no need to trend anymore    4. Hypothyroidism.  5. HTN  6. Age related debility    Prophylactic measures  DVT ppx with Heparin 5000 SQ   GI ppx not indicated  Heart healthy diet    DNR/DNI    Dispo - dc planning to STR in SNF.
1. Skin tears with superimposed infection.  - improving  - off IV ABx, switched to PO ABX.   - wound care  - Wound cx - E. fecalis     2. Chronic A. fib, rate controlled.  - not a candidate for AC due to age, risk of falls and family wish.    3. Acute on chronic anemia  - stable Hb, no need to trend anymore    4. Hypothyroidism.  5. HTN  6. Age related debility    Prophylactic measures  DVT ppx with Heparin 5000 SQ   GI ppx not indicated  Heart healthy diet    DNR/DNI    Dispo - dc planning to STR in SNF.
89 y/o F with PMH history of HTN, HLD, hypothyroidism, dementia, and history of multiple skin tears, presented from home for RLE and LUE skin tears.    # Hematoma LLE without evidence of cellulitis/fascitis/asbscess/Om.  s/p Debridement  03/14/2018  sharp excisional debridement right lower leg [id number] and left arm 10x5cm  -local wound care with silverdene     -No further surgical intervention.    -PO vantin 100 q12 for 7 days.     #Paroxysmal A-fibb  -CHADSvasc 4 but given poor functional status, no ac.       # HTN / DLD: Continue amlodipine, losartan, aspirin, atorvastatin, and metoprolol.    # Dementia: Continue memantine.    # Hypothyroidism: Continue synthroid.    # GI / DVT PPx: not indicated / heparin.    # Disposition:   -DNR/ DNI  - Await placement
89 y/o F with PMH history of HTN, HLD, hypothyroidism, dementia, and history of multiple skin tears, presented from home for RLE and LUE skin tears.    # Hematoma LLE without evidence of cellulitis/fascitis/asbscess/Om.  s/p Debridement  03/14/2018  sharp excisional debridement right lower leg [id number] and left arm 10x5cm  -local wound care with silverdene     -No further surgical intervention.    -PO vantin 100 q12 for 7 days.     #Paroxysmal A-fibb  -CHADSvasc 4 but given poor functional status, no ac.       # HTN / DLD: Continue amlodipine, losartan, aspirin, atorvastatin, and metoprolol.    # Dementia: Continue memantine.    # Hypothyroidism: Continue synthroid.    # GI / DVT PPx: not indicated / heparin.    # Disposition:   -DNR/ DNI  -prepared for D/c. no further labs.
89 y/o F with PMH history of HTN, HLD, hypothyroidism, dementia, and history of multiple skin tears, presented from home for RLE and LUE skin tears.    # Hematoma LLE without evidence of cellulitis/fascitis/asbscess/Om.  s/p Debridement  03/14/2018  sharp excisional debridement right lower leg [id number] and left arm 10x5cm  -local wound care with silverdene     -No further surgical intervention.    -PO vantin 100 q12 for 7 days.     #Paroxysmal A-fibb  -CHADSvasc 4 but given poor functional status, no ac.       # HTN / DLD: Continue amlodipine, losartan, aspirin, atorvastatin, and metoprolol.    # Dementia: Continue memantine.    # Hypothyroidism: Continue synthroid.    # GI / DVT PPx: not indicated / heparin.    # Disposition:   -DNR/ DNI  -family wants patient to go to SNF.
89 y/o F with PMH history of HTN, HLD, hypothyroidism, dementia, and history of multiple skin tears, presented from home for RLE and LUE skin tears.    # Sepsis with suspected source: Cellulitis  -Pt meets sirs criteria by temp <36 and wbc <4k.   -Unasyn 1 gram IV q6    #Skin ulcers s/p Debridement  03/14/2018  sharp excisional debridement right lower leg [id number] and left arm 10x5cm  -local wound care with silverdene  -f/u burn    #Paroxysmal afibb  CHADSvasc 4 but given poor functional status, no ac.  Family agrees with plan.      # HTN / DLD: Continue amlodipine, losartan, aspirin, atorvastatin, and metoprolol.    # Dementia: Continue memantine.    # Hypothyroidism: Continue synthroid.    # GI / DVT PPx: not indicated / heparin    # Disposition:   -DNR/ DNI
89 y/o F with PMH history of HTN, HLD, hypothyroidism, dementia, and history of multiple skin tears, presented from home for RLE and LUE skin tears.    # Sepsis with suspected source: Cellulitis - resolved  -Unasyn 1 gram IV q6. Can change to PO on discharge.     #Skin ulcers s/p Debridement  03/14/2018  sharp excisional debridement right lower leg [id number] and left arm 10x5cm  -local wound care with silverdene  -per burn, no further surgical intervention.  Burn signing off.     #Paroxysmal afibb  CHADSvasc 4 but given poor functional status, no ac.  Family agrees with plan.      # HTN / DLD: Continue amlodipine, losartan, aspirin, atorvastatin, and metoprolol.    # Dementia: Continue memantine.    # Hypothyroidism: Continue synthroid.    # GI / DVT PPx: not indicated / heparin    # Disposition:   -DNR/ DNI
89 yo F with necrotizing skin tear on RLE and LUE.   Pt noted to be hypothermic and leukopenic today.   1. Sepsis due to skin infection.  - continue IV hydration  - will switch to Meropenem IV  - send BCx x2, UA, f/u postop Cx.  - wound care by burn    2. Chronic A. fib, rate controlled.  - not a candidate for AC due to age, risk of falls and family wish.    3. HTN  4. Hypothyroidism.  5. Frailty.     Prophylactic measures  DVT ppx with Heparin 5000 SQ   GI ppx not indicated  Heart healthy diet      Dispo - pending clinical course.
Patient is a 89y old Female who presents with a chief complaint of R. LE skin tear with purulence. Currently admitted to medicine with the primary diagnosis of Cellulitis of right LE. Today is hospital day 1d. This morning she is resting comfortably in bed and reports no new issues or overnight events. Hospital course complicated by new onset Afib
Hematoma LLE without evidence of cellulitis/fascitis/asbscess/Om.  Hematoma quite deep. Will cover with antibiotics.    Local care with silverdene.  Could change to po vantin 100mg q12h for 7 more days. D/C Unasyn.  Recall prn please.

## 2018-03-21 ENCOUNTER — OUTPATIENT (OUTPATIENT)
Dept: OUTPATIENT SERVICES | Facility: HOSPITAL | Age: 83
LOS: 1 days | Discharge: HOME | End: 2018-03-21

## 2018-03-21 DIAGNOSIS — D64.9 ANEMIA, UNSPECIFIED: ICD-10-CM

## 2018-03-21 DIAGNOSIS — I48.2 CHRONIC ATRIAL FIBRILLATION: ICD-10-CM

## 2018-03-21 DIAGNOSIS — Z66 DO NOT RESUSCITATE: ICD-10-CM

## 2018-03-21 DIAGNOSIS — Z96.653 PRESENCE OF ARTIFICIAL KNEE JOINT, BILATERAL: ICD-10-CM

## 2018-03-21 DIAGNOSIS — S50.12XA CONTUSION OF LEFT FOREARM, INITIAL ENCOUNTER: ICD-10-CM

## 2018-03-21 DIAGNOSIS — A41.9 SEPSIS, UNSPECIFIED ORGANISM: ICD-10-CM

## 2018-03-21 DIAGNOSIS — L03.115 CELLULITIS OF RIGHT LOWER LIMB: ICD-10-CM

## 2018-03-21 DIAGNOSIS — L03.90 CELLULITIS, UNSPECIFIED: ICD-10-CM

## 2018-03-21 DIAGNOSIS — S80.11XA CONTUSION OF RIGHT LOWER LEG, INITIAL ENCOUNTER: ICD-10-CM

## 2018-03-21 DIAGNOSIS — F03.90 UNSPECIFIED DEMENTIA WITHOUT BEHAVIORAL DISTURBANCE: ICD-10-CM

## 2018-03-21 DIAGNOSIS — Z98.890 OTHER SPECIFIED POSTPROCEDURAL STATES: Chronic | ICD-10-CM

## 2018-03-21 DIAGNOSIS — E78.5 HYPERLIPIDEMIA, UNSPECIFIED: ICD-10-CM

## 2018-03-21 DIAGNOSIS — I10 ESSENTIAL (PRIMARY) HYPERTENSION: ICD-10-CM

## 2018-03-21 DIAGNOSIS — T81.4XXA INFECTION FOLLOWING A PROCEDURE, INITIAL ENCOUNTER: ICD-10-CM
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
## 2018-03-21 DIAGNOSIS — B95.2 ENTEROCOCCUS AS THE CAUSE OF DISEASES CLASSIFIED ELSEWHERE: ICD-10-CM

## 2018-03-21 DIAGNOSIS — E03.9 HYPOTHYROIDISM, UNSPECIFIED: ICD-10-CM

## 2018-03-21 DIAGNOSIS — R53.81 OTHER MALAISE: ICD-10-CM

## 2018-03-21 DIAGNOSIS — L98.499 NON-PRESSURE CHRONIC ULCER OF SKIN OF OTHER SITES WITH UNSPECIFIED SEVERITY: ICD-10-CM
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
## 2018-03-21 LAB
CULTURE RESULTS: SIGNIFICANT CHANGE UP
CULTURE RESULTS: SIGNIFICANT CHANGE UP
SPECIMEN SOURCE: SIGNIFICANT CHANGE UP
SPECIMEN SOURCE: SIGNIFICANT CHANGE UP

## 2018-03-22 LAB
CULTURE RESULTS: SIGNIFICANT CHANGE UP
SPECIMEN SOURCE: SIGNIFICANT CHANGE UP

## 2018-03-23 ENCOUNTER — OUTPATIENT (OUTPATIENT)
Dept: OUTPATIENT SERVICES | Facility: HOSPITAL | Age: 83
LOS: 1 days | Discharge: HOME | End: 2018-03-23

## 2018-03-23 DIAGNOSIS — E88.09 OTHER DISORDERS OF PLASMA-PROTEIN METABOLISM, NOT ELSEWHERE CLASSIFIED: ICD-10-CM

## 2018-03-23 DIAGNOSIS — Z98.890 OTHER SPECIFIED POSTPROCEDURAL STATES: Chronic | ICD-10-CM

## 2018-03-27 ENCOUNTER — OUTPATIENT (OUTPATIENT)
Dept: OUTPATIENT SERVICES | Facility: HOSPITAL | Age: 83
LOS: 1 days | Discharge: HOME | End: 2018-03-27

## 2018-03-27 DIAGNOSIS — Z98.890 OTHER SPECIFIED POSTPROCEDURAL STATES: Chronic | ICD-10-CM

## 2018-03-27 DIAGNOSIS — L03.114 CELLULITIS OF LEFT UPPER LIMB: ICD-10-CM

## 2018-03-29 ENCOUNTER — OUTPATIENT (OUTPATIENT)
Dept: OUTPATIENT SERVICES | Facility: HOSPITAL | Age: 83
LOS: 1 days | Discharge: HOME | End: 2018-03-29

## 2018-03-29 DIAGNOSIS — D64.9 ANEMIA, UNSPECIFIED: ICD-10-CM

## 2018-03-29 DIAGNOSIS — Z98.890 OTHER SPECIFIED POSTPROCEDURAL STATES: Chronic | ICD-10-CM

## 2018-04-23 ENCOUNTER — OUTPATIENT (OUTPATIENT)
Dept: OUTPATIENT SERVICES | Facility: HOSPITAL | Age: 83
LOS: 1 days | Discharge: HOME | End: 2018-04-23

## 2018-04-23 DIAGNOSIS — Z98.890 OTHER SPECIFIED POSTPROCEDURAL STATES: Chronic | ICD-10-CM

## 2018-04-23 DIAGNOSIS — Z02.9 ENCOUNTER FOR ADMINISTRATIVE EXAMINATIONS, UNSPECIFIED: ICD-10-CM

## 2018-04-23 DIAGNOSIS — R11.10 VOMITING, UNSPECIFIED: ICD-10-CM

## 2018-05-08 ENCOUNTER — OUTPATIENT (OUTPATIENT)
Dept: OUTPATIENT SERVICES | Facility: HOSPITAL | Age: 83
LOS: 1 days | Discharge: HOME | End: 2018-05-08

## 2018-05-08 DIAGNOSIS — Z98.890 OTHER SPECIFIED POSTPROCEDURAL STATES: Chronic | ICD-10-CM

## 2018-05-08 DIAGNOSIS — R62.7 ADULT FAILURE TO THRIVE: ICD-10-CM

## 2019-04-19 NOTE — DISCHARGE NOTE ADULT - CARE PLAN
Parent(s) Principal Discharge DX:	Cellulitis  Goal:	Resolution  Assessment and plan of treatment:	Continue with PO antibiotics- Vantin till 3/24  Secondary Diagnosis:	Paroxysmal atrial fibrillation  Goal:	No anticoagulation  Assessment and plan of treatment:	Continue with medications  Secondary Diagnosis:	Dementia without behavioral disturbance, unspecified dementia type  Goal:	Maintenance

## 2022-09-28 NOTE — PATIENT PROFILE ADULT. - FUNCTIONAL SCREEN CURRENT LEVEL: SWALLOWING (IF SCORE 2 OR MORE FOR ANY ITEM, CONSULT REHAB SERVICES), MLM)
(0) swallows foods/liquids without difficulty No Repair - Repaired With Adjacent Surgical Defect Text (Leave Blank If You Do Not Want): After obtaining clear surgical margins the defect was repaired concurrently with another surgical defect which was in close approximation.

## 2023-02-13 NOTE — PROGRESS NOTE ADULT - ATTENDING COMMENTS
Called patient to schedule TCV appt following discharge from [facility] 02/12/23. Dx Rash/ Epistaxis  Severe thrombocytopenia. Per discharge  Kiersten Galindo MD, MD Follow up in 5 week(s).     Specialty: Hematology and Oncology  Patient also stated she has appt in May to est with Dr Prashanth Fonseca MD.    1st attempt to contact patient using phone number listed in chart unable to LVM Addendum:  Patient seen and examined independently. Agree with resident note/ history / physical exam and plan of care with following exceptions. Case discussed with house-staff, nursing and patient/care giver.